# Patient Record
Sex: MALE | Race: WHITE | NOT HISPANIC OR LATINO | Employment: FULL TIME | URBAN - METROPOLITAN AREA
[De-identification: names, ages, dates, MRNs, and addresses within clinical notes are randomized per-mention and may not be internally consistent; named-entity substitution may affect disease eponyms.]

---

## 2017-01-27 ENCOUNTER — HOSPITAL ENCOUNTER (EMERGENCY)
Facility: MEDICAL CENTER | Age: 52
End: 2017-01-27
Attending: EMERGENCY MEDICINE

## 2017-01-27 ENCOUNTER — APPOINTMENT (OUTPATIENT)
Dept: RADIOLOGY | Facility: MEDICAL CENTER | Age: 52
End: 2017-01-27
Attending: EMERGENCY MEDICINE

## 2017-01-27 VITALS
HEART RATE: 60 BPM | TEMPERATURE: 96.8 F | HEIGHT: 69 IN | SYSTOLIC BLOOD PRESSURE: 106 MMHG | DIASTOLIC BLOOD PRESSURE: 78 MMHG | RESPIRATION RATE: 16 BRPM | BODY MASS INDEX: 24.44 KG/M2 | OXYGEN SATURATION: 91 % | WEIGHT: 165 LBS

## 2017-01-27 DIAGNOSIS — N20.1 URETEROLITHIASIS: ICD-10-CM

## 2017-01-27 LAB
APPEARANCE UR: CLEAR
BILIRUB UR QL STRIP.AUTO: NEGATIVE
COLOR UR: COLORLESS
CULTURE IF INDICATED INDCX: NO UA CULTURE
GLUCOSE UR STRIP.AUTO-MCNC: 70 MG/DL
KETONES UR STRIP.AUTO-MCNC: NEGATIVE MG/DL
LEUKOCYTE ESTERASE UR QL STRIP.AUTO: NEGATIVE
MICRO URNS: ABNORMAL
NITRITE UR QL STRIP.AUTO: NEGATIVE
PH UR STRIP.AUTO: 7 [PH]
PROT UR QL STRIP: NEGATIVE MG/DL
RBC UR QL AUTO: NEGATIVE
SP GR UR STRIP.AUTO: 1.01

## 2017-01-27 PROCEDURE — 36415 COLL VENOUS BLD VENIPUNCTURE: CPT

## 2017-01-27 PROCEDURE — 700105 HCHG RX REV CODE 258: Performed by: EMERGENCY MEDICINE

## 2017-01-27 PROCEDURE — 99284 EMERGENCY DEPT VISIT MOD MDM: CPT

## 2017-01-27 PROCEDURE — 96361 HYDRATE IV INFUSION ADD-ON: CPT

## 2017-01-27 PROCEDURE — 700102 HCHG RX REV CODE 250 W/ 637 OVERRIDE(OP): Performed by: EMERGENCY MEDICINE

## 2017-01-27 PROCEDURE — 96375 TX/PRO/DX INJ NEW DRUG ADDON: CPT

## 2017-01-27 PROCEDURE — 700111 HCHG RX REV CODE 636 W/ 250 OVERRIDE (IP): Performed by: EMERGENCY MEDICINE

## 2017-01-27 PROCEDURE — 81003 URINALYSIS AUTO W/O SCOPE: CPT

## 2017-01-27 PROCEDURE — 96374 THER/PROPH/DIAG INJ IV PUSH: CPT

## 2017-01-27 PROCEDURE — 74176 CT ABD & PELVIS W/O CONTRAST: CPT

## 2017-01-27 PROCEDURE — A9270 NON-COVERED ITEM OR SERVICE: HCPCS | Performed by: EMERGENCY MEDICINE

## 2017-01-27 RX ORDER — OXYCODONE HYDROCHLORIDE AND ACETAMINOPHEN 5; 325 MG/1; MG/1
1-2 TABLET ORAL EVERY 4 HOURS PRN
Qty: 20 TAB | Refills: 0 | Status: SHIPPED | OUTPATIENT
Start: 2017-01-27 | End: 2021-07-02

## 2017-01-27 RX ORDER — KETOROLAC TROMETHAMINE 30 MG/ML
30 INJECTION, SOLUTION INTRAMUSCULAR; INTRAVENOUS ONCE
Status: COMPLETED | OUTPATIENT
Start: 2017-01-27 | End: 2017-01-27

## 2017-01-27 RX ORDER — ONDANSETRON 2 MG/ML
4 INJECTION INTRAMUSCULAR; INTRAVENOUS ONCE
Status: COMPLETED | OUTPATIENT
Start: 2017-01-27 | End: 2017-01-27

## 2017-01-27 RX ORDER — OXYCODONE HYDROCHLORIDE AND ACETAMINOPHEN 5; 325 MG/1; MG/1
2 TABLET ORAL ONCE
Status: COMPLETED | OUTPATIENT
Start: 2017-01-27 | End: 2017-01-27

## 2017-01-27 RX ORDER — TAMSULOSIN HYDROCHLORIDE 0.4 MG/1
0.4 CAPSULE ORAL
Qty: 7 CAP | Refills: 1 | Status: SHIPPED | OUTPATIENT
Start: 2017-01-27 | End: 2021-07-02

## 2017-01-27 RX ORDER — SODIUM CHLORIDE 9 MG/ML
1000 INJECTION, SOLUTION INTRAVENOUS ONCE
Status: COMPLETED | OUTPATIENT
Start: 2017-01-27 | End: 2017-01-27

## 2017-01-27 RX ADMIN — SODIUM CHLORIDE 1000 ML: 9 INJECTION, SOLUTION INTRAVENOUS at 06:29

## 2017-01-27 RX ADMIN — HYDROMORPHONE HYDROCHLORIDE 1 MG: 1 INJECTION, SOLUTION INTRAMUSCULAR; INTRAVENOUS; SUBCUTANEOUS at 06:45

## 2017-01-27 RX ADMIN — ONDANSETRON 4 MG: 2 INJECTION, SOLUTION INTRAMUSCULAR; INTRAVENOUS at 06:45

## 2017-01-27 RX ADMIN — KETOROLAC TROMETHAMINE 30 MG: 30 INJECTION, SOLUTION INTRAMUSCULAR; INTRAVENOUS at 06:45

## 2017-01-27 RX ADMIN — OXYCODONE HYDROCHLORIDE AND ACETAMINOPHEN 2 TABLET: 5; 325 TABLET ORAL at 08:11

## 2017-01-27 ASSESSMENT — PAIN SCALES - GENERAL
PAINLEVEL_OUTOF10: 3
PAINLEVEL_OUTOF10: 2
PAINLEVEL_OUTOF10: 2

## 2017-01-27 ASSESSMENT — LIFESTYLE VARIABLES: DO YOU DRINK ALCOHOL: NO

## 2017-01-27 NOTE — ED AVS SNAPSHOT
HelloWallet Access Code: [email or web address]  Expires: 2/26/2017  9:06 AM    Your email address is not on file at [facility].  Email Addresses are required for you to sign up for HelloWallet, please contact [phone] to verify your personal information and to provide your email address prior to attempting to register for HelloWallet.    Iron Chaudhari  [address]  [place] [address]    HelloWallet  A secure, online tool to manage your health information     [facility]’s HelloWallet® is a secure, online tool that connects you to your personalized health information from the privacy of your home -- day or night - making it very easy for you to manage your healthcare. Once the activation process is completed, you can even access your medical information using the HelloWallet amos, which is available for free in the Apple Amos store or Google Play store.     To learn more about HelloWallet, visit [email or web address]    There are two levels of access available (as shown below):   My Chart Features  [facility] Primary Care Doctor [facility]  Specialists [facility]  Urgent  Care Non-[facility] Primary Care Doctor   Email your healthcare team securely and privately 24/7 X X X    Manage appointments: schedule your next appointment; view details of past/upcoming appointments X      Request prescription refills. X      View recent personal medical records, including lab and immunizations X X X X   View health record, including health history, allergies, medications X X X X   Read reports about your outpatient visits, procedures, consult and ER notes X X X X   See your discharge summary, which is a recap of your hospital and/or ER visit that includes your diagnosis, lab results, and care plan X X  X     How to register for Linguastatt:  Once your e-mail address has been verified, follow the following steps to sign up for Linguastatt.     1. Go to  [email or web address]  2. Click on the Sign Up Now box, which takes you to the New Member Sign Up page. You  will need to provide the following information:  a. Enter your Skoodat Access Code exactly as it appears at the top of this page. (You will not need to use this code after you’ve completed the sign-up process. If you do not sign up before the expiration date, you must request a new code.)   b. Enter your date of birth.   c. Enter your home email address.   d. Click Submit, and follow the next screen’s instructions.  3. Create a Transcarga.pet ID. This will be your Skoodat login ID and cannot be changed, so think of one that is secure and easy to remember.  4. Create a Skoodat password. You can change your password at any time.  5. Enter your Password Reset Question and Answer. This can be used at a later time if you forget your password.   6. Enter your e-mail address. This allows you to receive e-mail notifications when new information is available in Skoodat.  7. Click Sign Up. You can now view your health information.    For assistance activating your Skoodat account, call [phone]

## 2017-01-27 NOTE — ED AVS SNAPSHOT
After Visit Summary                                                                                                                Iron Chaudhari   MRN: [id number]    Department:  [facility], Emergency Dept   Date of Visit:  1/27/2017            [facility], Emergency Dept    [address] [place] [address]    Phone:  [phone]      You were seen by     Jm Pool M.D.      Your Diagnosis Was     Ureterolithiasis     N20.1       These are the medications you received during your hospitalization from 01/27/2017 0544 to 01/27/2017 0906     Date/Time Order Dose Route Action    01/27/2017 0629 NS infusion 1,000 mL 1,000 mL Intravenous New Bag    01/27/2017 0645 ketorolac (TORADOL) injection 30 mg 30 mg Intravenous Given    01/27/2017 0645 HYDROmorphone (DILAUDID) injection 1 mg 1 mg Intravenous Given    01/27/2017 0645 ondansetron (ZOFRAN) syringe/vial injection 4 mg 4 mg Intravenous Given    01/27/2017 0811 oxycodone-acetaminophen (PERCOCET) 5-325 MG per tablet 2 Tab 2 Tab Oral Given      Follow-up Information     1. Follow up with [facility], Emergency Dept.    Specialty:  Emergency Medicine    Why:  If symptoms worsen    Contact information    [address] [place] [id number] [phone]      Medication Information     Review all of your home medications and newly ordered medications with your primary doctor and/or pharmacist as soon as possible. Follow medication instructions as directed by your doctor and/or pharmacist.     Please keep your complete medication list with you and share with your physician. Update the information when medications are discontinued, doses are changed, or new medications (including over-the-counter products) are added; and carry medication information at all times in the event of emergency situations.               Medication List      START taking these medications        Instructions    oxycodone-acetaminophen 5-325 MG Tabs   Commonly known as:  PERCOCET    Take 1-2 Tabs by mouth every four hours as needed.   Dose:  1-2 Tab       tamsulosin 0.4 MG capsule   Commonly known as:  FLOMAX    Take 1 Cap by mouth ONE-HALF HOUR AFTER BREAKFAST. Take daily until stone passes.   Dose:  0.4 mg               Procedures and tests performed during your visit     CT-RENAL COLIC EVALUATION(A/P W/O)    IV Saline Lock    URINALYSIS CULTURE, IF INDICATED    URINE STRAINER        Discharge Instructions       Kidney Stones  Kidney stones (urolithiasis) are deposits that form inside your kidneys. The intense pain is caused by the stone moving through the urinary tract. When the stone moves, the ureter goes into spasm around the stone. The stone is usually passed in the urine.   CAUSES   · A disorder that makes certain neck glands produce too much parathyroid hormone (primary hyperparathyroidism).  · A buildup of uric acid crystals, similar to gout in your joints.  · Narrowing (stricture) of the ureter.  · A kidney obstruction present at birth (congenital obstruction).  · Previous surgery on the kidney or ureters.  · Numerous kidney infections.  SYMPTOMS   · Feeling sick to your stomach (nauseous).  · Throwing up (vomiting).  · Blood in the urine (hematuria).  · Pain that usually spreads (radiates) to the groin.  · Frequency or urgency of urination.  DIAGNOSIS   · Taking a history and physical exam.  · Blood or urine tests.  · CT scan.  · Occasionally, an examination of the inside of the urinary bladder (cystoscopy) is performed.  TREATMENT   · Observation.  · Increasing your fluid intake.  · Extracorporeal shock wave lithotripsy--This is a noninvasive procedure that uses shock waves to break up kidney stones.  · Surgery may be needed if you have severe pain or persistent obstruction. There are various surgical procedures. Most of the procedures are performed with the use of small instruments. Only small incisions are  needed to accommodate these instruments, so recovery time is minimized.  The size, location, and chemical composition are all important variables that will determine the proper choice of action for you. Talk to your health care provider to better understand your situation so that you will minimize the risk of injury to yourself and your kidney.   HOME CARE INSTRUCTIONS   · Drink enough water and fluids to keep your urine clear or pale yellow. This will help you to pass the stone or stone fragments.  · Strain all urine through the provided strainer. Keep all particulate matter and stones for your health care provider to see. The stone causing the pain may be as small as a grain of salt. It is very important to use the strainer each and every time you pass your urine. The collection of your stone will allow your health care provider to analyze it and verify that a stone has actually passed. The stone analysis will often identify what you can do to reduce the incidence of recurrences.  · Only take over-the-counter or prescription medicines for pain, discomfort, or fever as directed by your health care provider.  · Make a follow-up appointment with your health care provider as directed.  · Get follow-up X-rays if required. The absence of pain does not always mean that the stone has passed. It may have only stopped moving. If the urine remains completely obstructed, it can cause loss of kidney function or even complete destruction of the kidney. It is your responsibility to make sure X-rays and follow-ups are completed. Ultrasounds of the kidney can show blockages and the status of the kidney. Ultrasounds are not associated with any radiation and can be performed easily in a matter of minutes.  SEEK MEDICAL CARE IF:  · You experience pain that is progressive and unresponsive to any pain medicine you have been prescribed.  SEEK IMMEDIATE MEDICAL CARE IF:   · Pain cannot be controlled with the prescribed medicine.  · You  have a fever or shaking chills.  · The severity or intensity of pain increases over 18 hours and is not relieved by pain medicine.  · You develop a new onset of abdominal pain.  · You feel faint or pass out.  · You are unable to urinate.  MAKE SURE YOU:   · Understand these instructions.  · Will watch your condition.  · Will get help right away if you are not doing well or get worse.     This information is not intended to replace advice given to you by your health care provider. Make sure you discuss any questions you have with your health care provider.     Document Released: 12/18/2006 Document Revised: 01/08/2016 Document Reviewed: 05/21/2014  Ahalogy Interactive Patient Education ©2016 Elsevier Inc.            Patient Information     Patient Information    Following emergency treatment: all patient requiring follow-up care must return either to a private physician or a clinic if your condition worsens before you are able to obtain further medical attention, please return to the emergency room.     Billing Information    At [facility], we work to make the billing process streamlined for our patients.  Our Representatives are here to answer any questions you may have regarding your hospital bill.  If you have insurance coverage and have supplied your insurance information to us, we will submit a claim to your insurer on your behalf.  Should you have any questions regarding your bill, we can be reached online or by phone as follows:  Online: You are able pay your bills online or live chat with our representatives about any billing questions you may have. We are here to help Monday - Friday from 8:00am to 7:30pm and 9:00am - 12:00pm on Saturdays.  Please visit [email or web address]  for more information.   Phone:  [phone] or [phone]    Please note that your emergency physician, surgeon, pathologist, radiologist, anesthesiologist, and other specialists are not employed  by [facility] and will therefore bill separately for their services.  Please contact them directly for any questions concerning their bills at the numbers below:     Emergency Physician Services:  [phone]  [facility]:  [phone]  [facility]:  [phone]  [facility]:  [phone]    1. Your final bill may vary from the amount quoted upon discharge if all procedures are not complete at that time, or if your doctor has additional procedures of which we are not aware. You will receive an additional bill if you return to the Emergency Department at [facility] for suture removal regardless of the facility of which the sutures were placed.     2. Please arrange for settlement of this account at the emergency registration.    3. All self-pay accounts are due in full at the time of treatment.  If you are unable to meet this obligation then payment is expected within 4-5 days.     4. If you have had radiology studies (CT, X-ray, Ultrasound, MRI), you have received a preliminary result during your emergency department visit. Please contact the radiology department [phone] to receive a copy of your final result. Please discuss the Final result with your primary physician or with the follow up physician provided.     Crisis Hotline:  [place] Crisis Hotline:  3-423-GNGAXLZ or 1-503.888.1437  [place] Crisis Hotline:    1-615.511.3585 or 671-780-2043         ED Discharge Follow Up Questions    1. In order to provide you with very good care, we would like to follow up with a phone call in the next few days.  May we have your permission to contact you?     YES /  NO    2. What is the best phone number to call you? (       )_____-__________    3. What is the best time to call you?      Morning  /  Afternoon  /  Evening                   Patient Signature:  ____________________________________________________________    Date:   ____________________________________________________________

## 2017-01-27 NOTE — ED AVS SNAPSHOT
1/27/2017          Iron Chaudhari  [address]  [place] [address]    Dear Iron:    [facility] wants to ensure your discharge home is safe and you or your loved ones have had all your questions answered regarding your care after you leave the hospital.    You may receive a telephone call within two days of your discharge.  This call is to make certain you understand your discharge instructions as well as ensure we provided you with the best care possible during your stay with us.     The call will only last approximately 3-5 minutes and will be done by a nurse.    Once again, we want to ensure your discharge home is safe and that you have a clear understanding of any next steps in your care.  If you have any questions or concerns, please do not hesitate to contact us, we are here for you.  Thank you for choosing [facility] for your healthcare needs.    Sincerely,    Ronaldo Crooks    [facility]

## 2017-01-27 NOTE — ED PROVIDER NOTES
"ED Provider Note    Scribed for Jm Pool M.D. by Maria Luisa Hua. 1/27/2017  6:16 AM    Primary care provider: Pcp Pt States None  Means of arrival: Wheel Chair   History obtained from: Patient   History limited by: acute medical condition     CHIEF COMPLAINT  Chief Complaint   Patient presents with   • Testicle Pain     severe pain for 2 hours. on and off for 2 days.   • Flank Pain     left side.    • LLQ Pain       HPI  Iron Chaudhari is a 51 y.o. male who presents to the Emergency Department with severe left sided flank pain onset 2 days ago. Patient reports intermittent testicular pain and left lower quadrant pain that has been worsening greatly over the past 2 hours. He also reports fever and nausea. Patient denies history of kidney stones. He denies fever or chills.     History of present illness limited secondary to acute medical condition.     REVIEW OF SYSTEMS  Pertinent positives include left flank pain, testicular pain, left lower quadrant pain, fever, nausea.   Pertinent negatives include no fever, chills.      Review of systems limited secondary to acute medical condition.     PAST MEDICAL HISTORY       SURGICAL HISTORY  patient denies any surgical history    SOCIAL HISTORY  Social History   Substance Use Topics   • Smoking status: Current Every Day Smoker   • Alcohol Use: Yes      History   Drug Use No       FAMILY HISTORY  None noted.     CURRENT MEDICATIONS  No current facility-administered medications on file prior to encounter.     No current outpatient prescriptions on file prior to encounter.       ALLERGIES  No Known Allergies    PHYSICAL EXAM  VITAL SIGNS: /80 mmHg  Pulse 62  Temp(Src) 36 °C (96.8 °F)  Resp 18  Ht 1.753 m (5' 9\")  Wt 74.844 kg (165 lb)  BMI 24.36 kg/m2  SpO2 99%    Nursing note and vitals reviewed.  Constitutional: Severe distress. Writhing in bed.   HENT: Head is normocephalic and atraumatic. Oropharynx is clear and moist without exudate or erythema. "   Eyes: Pupils are equal, round, and reactive to light. Conjunctiva are normal.   Cardiovascular: Normal rate and regular rhythm. No murmur heard. Normal radial pulses.  Pulmonary/Chest: Breath sounds normal. No wheezes or rales.   Abdominal: Soft. No distention.  Diffuse left flank tenderness to palpation.   Musculoskeletal: Extremities exhibit normal range of motion without edema.   Neurological: Awake, alert and oriented to person, place, and time. No focal deficits noted.  Skin: Skin is warm and dry. No rash.   Psychiatric: Normal mood and affect. Appropriate for clinical situation    LABS  Results for orders placed or performed during the hospital encounter of 01/27/17   URINALYSIS CULTURE, IF INDICATED   Result Value Ref Range    Color Colorless     Character Clear     Specific Gravity 1.009 <1.035    Ph 7.0 5.0-8.0    Glucose 70 (A) Negative mg/dL    Ketones Negative Negative mg/dL    Protein Negative Negative mg/dL    Bilirubin Negative Negative    Nitrite Negative Negative    Leukocyte Esterase Negative Negative    Occult Blood Negative Negative    Micro Urine Req see below     Culture Indicated No UA Culture   All labs reviewed by me.    RADIOLOGY  CT-RENAL COLIC EVALUATION(A/P W/O)   Final Result         1. Obstructing 4 x 6 mm calculus in the proximal left ureter with moderate left hydronephrosis.      2. No evidence of inflammatory change in the abdomen or pelvis.  The study is however limited due to nonuse of intravenous contrast      The radiologist's interpretation of all radiological studies have been reviewed by me.    COURSE & MEDICAL DECISION MAKING  Nursing notes, VS, PMSFHx reviewed in chart.       6:16 AM - Patient seen and examined at bedside. Patient will be treated with IV fluids, Toradol 30 mg IV, Dilaudid 1 mg IV, and Zofran 4 mg IV. Ordered CT renal colic and urinalysis culture to evaluate his symptoms. I am highly suspicious for kidney stone. I will obtain a CT to confirm.      6:51 AM  Upon recheck, patient reports feeling improved. His pain is now controlled.     8:01 AM Reviewed radiology results indicating 4 by 6 mm kidney stone present.     8:02 AM Upon reassessment, patient is resting in bed. He reports feeling improved. I informed the patient of moderate sized kidney stone present. I will give Percocet. If the patient reports feeling improved, he can be discharged home with a prescription for pain management. If his pain is not relieved with pain medication, he will be admitted. Patient understands and agrees.     8:44 AM Patient rechecked; he reports feeling improved. Patient will be discharged home with prescriptions for pain management. He should return for worsening symptoms. Patient understands and agrees.     The patient was discharged home with an information sheet on kidney stones and told to return immediately for any signs or symptoms listed.  The patient agreed to the discharge precautions and follow-up plan which is documented in EPIC.      I reviewed prescription monitoring program for patient's narcotic use before prescribing a scheduled drug.The patient will not drink alcohol nor drive with prescribed medications. The patient will return for new or worsening symptoms and is stable at the time of discharge.    The patient is referred to a primary physician for blood pressure management, diabetic screening, and for all other preventative health concerns.    DISPOSITION:  Patient will be discharged home in stable condition.    FOLLOW UP:  [facility], Emergency Dept  [address] [address] [phone]    If symptoms worsen      OUTPATIENT MEDICATIONS:  New Prescriptions    OXYCODONE-ACETAMINOPHEN (PERCOCET) 5-325 MG TAB    Take 1-2 Tabs by mouth every four hours as needed.    TAMSULOSIN (FLOMAX) 0.4 MG CAPSULE    Take 1 Cap by mouth ONE-HALF HOUR AFTER BREAKFAST. Take daily until stone passes.           FINAL IMPRESSION  1. Ureterolithiasis           IMaria Luisa (Scribe), am scribing for, and in the presence of, Jm Pool M.D..    Electronically signed by: Maria Luisa Hua ([place]), 1/27/2017    Jm FAITH M.D. personally performed the services described in this documentation, as scribed by Maria Luisa Hua in my presence, and it is both accurate and complete.    The note accurately reflects work and decisions made by me.  Jm Pool  1/27/2017  9:24 AM

## 2017-01-27 NOTE — DISCHARGE INSTRUCTIONS
Kidney Stones  Kidney stones (urolithiasis) are deposits that form inside your kidneys. The intense pain is caused by the stone moving through the urinary tract. When the stone moves, the ureter goes into spasm around the stone. The stone is usually passed in the urine.   CAUSES   · A disorder that makes certain neck glands produce too much parathyroid hormone (primary hyperparathyroidism).  · A buildup of uric acid crystals, similar to gout in your joints.  · Narrowing (stricture) of the ureter.  · A kidney obstruction present at birth (congenital obstruction).  · Previous surgery on the kidney or ureters.  · Numerous kidney infections.  SYMPTOMS   · Feeling sick to your stomach (nauseous).  · Throwing up (vomiting).  · Blood in the urine (hematuria).  · Pain that usually spreads (radiates) to the groin.  · Frequency or urgency of urination.  DIAGNOSIS   · Taking a history and physical exam.  · Blood or urine tests.  · CT scan.  · Occasionally, an examination of the inside of the urinary bladder (cystoscopy) is performed.  TREATMENT   · Observation.  · Increasing your fluid intake.  · Extracorporeal shock wave lithotripsy--This is a noninvasive procedure that uses shock waves to break up kidney stones.  · Surgery may be needed if you have severe pain or persistent obstruction. There are various surgical procedures. Most of the procedures are performed with the use of small instruments. Only small incisions are needed to accommodate these instruments, so recovery time is minimized.  The size, location, and chemical composition are all important variables that will determine the proper choice of action for you. Talk to your health care provider to better understand your situation so that you will minimize the risk of injury to yourself and your kidney.   HOME CARE INSTRUCTIONS   · Drink enough water and fluids to keep your urine clear or pale yellow. This will help you to pass the stone or stone fragments.  · Strain  all urine through the provided strainer. Keep all particulate matter and stones for your health care provider to see. The stone causing the pain may be as small as a grain of salt. It is very important to use the strainer each and every time you pass your urine. The collection of your stone will allow your health care provider to analyze it and verify that a stone has actually passed. The stone analysis will often identify what you can do to reduce the incidence of recurrences.  · Only take over-the-counter or prescription medicines for pain, discomfort, or fever as directed by your health care provider.  · Make a follow-up appointment with your health care provider as directed.  · Get follow-up X-rays if required. The absence of pain does not always mean that the stone has passed. It may have only stopped moving. If the urine remains completely obstructed, it can cause loss of kidney function or even complete destruction of the kidney. It is your responsibility to make sure X-rays and follow-ups are completed. Ultrasounds of the kidney can show blockages and the status of the kidney. Ultrasounds are not associated with any radiation and can be performed easily in a matter of minutes.  SEEK MEDICAL CARE IF:  · You experience pain that is progressive and unresponsive to any pain medicine you have been prescribed.  SEEK IMMEDIATE MEDICAL CARE IF:   · Pain cannot be controlled with the prescribed medicine.  · You have a fever or shaking chills.  · The severity or intensity of pain increases over 18 hours and is not relieved by pain medicine.  · You develop a new onset of abdominal pain.  · You feel faint or pass out.  · You are unable to urinate.  MAKE SURE YOU:   · Understand these instructions.  · Will watch your condition.  · Will get help right away if you are not doing well or get worse.     This information is not intended to replace advice given to you by your health care provider. Make sure you discuss any  questions you have with your health care provider.     Document Released: 12/18/2006 Document Revised: 01/08/2016 Document Reviewed: 05/21/2014  [employer] Patient Education ©2016 [employer]

## 2017-01-27 NOTE — ED NOTES
Chief Complaint   Patient presents with   • Testicle Pain     severe pain for 2 hours. on and off for 2 days.   • Flank Pain     left side.    • LLQ Pain     Pt ambulatory to triage with above complaint. Pt returned to [facility], educated on triage process, and to inform staff of any changes or concerns.   Patient states that the severity of his pain waxes and wanes.

## 2017-01-27 NOTE — ED NOTES
Agree with triage. Pt in visible distress. PIV placed, blood drawn and sent to lab. Pt placed in gown and on monitor. Chart up for ERP.

## 2021-07-02 ENCOUNTER — APPOINTMENT (OUTPATIENT)
Dept: RADIOLOGY | Facility: MEDICAL CENTER | Age: 56
End: 2021-07-02
Attending: EMERGENCY MEDICINE

## 2021-07-02 ENCOUNTER — HOSPITAL ENCOUNTER (EMERGENCY)
Facility: MEDICAL CENTER | Age: 56
End: 2021-07-02
Attending: EMERGENCY MEDICINE

## 2021-07-02 VITALS
TEMPERATURE: 98.3 F | SYSTOLIC BLOOD PRESSURE: 127 MMHG | WEIGHT: 175.93 LBS | DIASTOLIC BLOOD PRESSURE: 87 MMHG | HEART RATE: 73 BPM | BODY MASS INDEX: 26.66 KG/M2 | HEIGHT: 68 IN | OXYGEN SATURATION: 97 % | RESPIRATION RATE: 17 BRPM

## 2021-07-02 DIAGNOSIS — M75.31 CALCIFIC TENDINITIS OF RIGHT SHOULDER: ICD-10-CM

## 2021-07-02 DIAGNOSIS — S42.291A CLOSED HILL-SACHS FRACTURE OF RIGHT HUMERUS, INITIAL ENCOUNTER: ICD-10-CM

## 2021-07-02 DIAGNOSIS — M25.531 WRIST PAIN, ACUTE, RIGHT: ICD-10-CM

## 2021-07-02 PROCEDURE — 96374 THER/PROPH/DIAG INJ IV PUSH: CPT

## 2021-07-02 PROCEDURE — 96375 TX/PRO/DX INJ NEW DRUG ADDON: CPT

## 2021-07-02 PROCEDURE — 73030 X-RAY EXAM OF SHOULDER: CPT | Mod: RT

## 2021-07-02 PROCEDURE — 700111 HCHG RX REV CODE 636 W/ 250 OVERRIDE (IP): Performed by: EMERGENCY MEDICINE

## 2021-07-02 PROCEDURE — 73110 X-RAY EXAM OF WRIST: CPT | Mod: RT

## 2021-07-02 PROCEDURE — 99284 EMERGENCY DEPT VISIT MOD MDM: CPT

## 2021-07-02 PROCEDURE — 36415 COLL VENOUS BLD VENIPUNCTURE: CPT

## 2021-07-02 RX ORDER — OXYCODONE HYDROCHLORIDE AND ACETAMINOPHEN 5; 325 MG/1; MG/1
1 TABLET ORAL EVERY 8 HOURS PRN
Qty: 16 TABLET | Refills: 0 | Status: SHIPPED | OUTPATIENT
Start: 2021-07-02 | End: 2021-07-07

## 2021-07-02 RX ORDER — ONDANSETRON 2 MG/ML
4 INJECTION INTRAMUSCULAR; INTRAVENOUS ONCE
Status: COMPLETED | OUTPATIENT
Start: 2021-07-02 | End: 2021-07-02

## 2021-07-02 RX ADMIN — FENTANYL CITRATE 50 MCG: 50 INJECTION, SOLUTION INTRAMUSCULAR; INTRAVENOUS at 06:34

## 2021-07-02 RX ADMIN — ONDANSETRON 4 MG: 2 INJECTION INTRAMUSCULAR; INTRAVENOUS at 06:34

## 2021-07-02 ASSESSMENT — PAIN DESCRIPTION - PAIN TYPE: TYPE: ACUTE PAIN

## 2021-07-02 NOTE — ED NOTES
Pt given d/c paperwork and RX p/u information, pt verbalized understanding all  information given, narc consent signed and placed in chart.    ED tech at BS to place sling and wrist splint per order

## 2021-07-02 NOTE — DISCHARGE INSTRUCTIONS
Apply ice to your sore areas.  Use ibuprofen 600 mg for pain.  Take the narcotic pain medication for severe pain.  Do not drive or operate heavy machinery if taking.  It is very important you follow-up with the orthopedic doctor to further evaluate your wrist and shoulder pain.  I would avoid shoveling or strenuous use of your wrist or shoulder for the next 7 days.  You can do gentle range of motion of your shoulder and wrist.  Any numbness weakness fever worsening pain return.  I hope you feel better soon.

## 2021-07-02 NOTE — ED PROVIDER NOTES
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 States he has had problems with the shoulder in the past.   • Wrist Injury     States tat while he was working he thinks he sprained his right wrist       HPI  Iron Chaudhari is a 55 y.o. male who presents with shoulder and wrist pain.  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  He has a history of multiple shoulder dislocations in the past.  States usually once he relaxes it will go back in.  However he is waited 3 days and he still having difficulty moving it and feels like it still out.  Patient denies any numbness tingling or weakness in his hand.  No fevers.  Patient also is complaining of right wrist pain.  He thinks he sprained his wrist.  Thinks it is related to shoveling.  He has pain with flexion extension of his wrist.  Feels better when he wears his Ace wrap on it.  Denies any coolness to his hand.    REVIEW OF SYSTEMS  Positive for wrist and shoulder pain, Negative for no numbness, coolness, weakness, skin rash, fevers.    PAST MEDICAL HISTORY   Previous shoulder dislocation    SOCIAL HISTORY  Social History     Tobacco Use   • Smoking status: Current Some Day Smoker   • Smokeless tobacco: Current User     Types: Chew   Vaping Use   • Vaping Use: Never used   Substance and Sexual Activity   • Alcohol use: Yes   • Drug use: No   • Sexual activity: Not on file       SURGICAL HISTORY   has a past surgical history that includes ear middle exploration.    CURRENT MEDICATIONS  Reviewed.  See Encounter Summary.      ALLERGIES  No Known Allergies    PHYSICAL EXAM  VITAL SIGNS: /101   Pulse 84   Temp 36.8 °C (98.3 °F) (Temporal)   Resp 19   Ht 1.727 m (5' 8\")   Wt 79.8 kg (175 lb 14.8 oz)   SpO2 99%   BMI 26.75 kg/m²   Constitutional:  Alert in mild apparent " distress.  HENT: Normocephalic, Bilateral external ears normal. Nose normal.   Eyes: Pupils are equal and reactive. Conjunctiva normal, non-icteric.   Thorax & Lungs: Easy unlabored respirations, clear to auscultation  Cardiovascular: Regular rate and rhythm  Abdomen:  No gross signs of peritonitis, no pain with movement   Skin: Visualized skin is  Dry, No erythema, No rash.   Extremities: Deformity to right shoulder, anterior fullness, unable to lift arm secondary to pain, no warmth erythema to the shoulder, +2 radial pulse, tenderness in the wrist over the scaphoid.  Minimal wrist swelling.  No warmth or erythema to the wrist.  Pain with range of motion.  Good  strength.  Good cap refill.  Intact sensation to light touch.  Neurologic: Alert, Grossly non-focal.   Psychiatric: Affect and Mood normal    Radiology  DX-WRIST-COMPLETE 3+ RIGHT   Final Result      No radiographic evidence of acute traumatic injury.      DX-SHOULDER 2+ RIGHT   Final Result      Dorsal humeral head cortex irregularity could represent an acute or chronic Hill-Sachs fracture      Small overlying calcification could be acute or chronic with dystrophic calcification, calcific tendinopathy or displaced cortical fracture      Given provided history of dislocation and possible Hill-Sachs fracture, follow-up shoulder MRI is advised to further characterize            COURSE & MEDICAL DECISION MAKING  Nursing notes and vital signs were reviewed. (See chart for details)    The patient presents to the Emergency Department with shoulder and wrist pain.  Shoulder pain likely dislocation versus subluxation.  History of frequent dislocations in the past.  Patient also with wrist pain.  Differential includes possible tendinitis versus ligament injury to the wrist versus fracture.  No history of fevers or illness I do not suspect an infectious etiology.    X-ray has returned to does not show dislocation.  I suspect this is likely [place] Sachs fracture  although the exact timing of it is unclear.  With his history of multiple previous shoulder dislocations I am unsure if this is acute or chronic.  Suspect this is likely more chronic.  I will place him in a sling.  I stressed to the patient he also likely has ligament injury and needs to follow-up with Ortho as he may need an MRI to further evaluate.  Concerning his wrist we placed in a Velcro splint.  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  He does have some scaphoid tenderness but no history of acute injury and therefore I doubt acute scaphoid fracture.  He will be placed in a Velcro splint.  And I stressed again follow-up with Ortho.  He understands the plan.    In prescribing controlled substances to this patient, I certify that I have obtained and reviewed the medical history of Iron Chaudhari. I have also made a good renate effort to obtain applicable records from other providers who have treated the patient and no other records are available at this time.     I have conducted a physical exam and documented it. I have reviewed Mr. Chaudhari’s prescription history as maintained by the [place] Prescription Monitoring Program.     I have assessed the patient’s risk for abuse, dependency, and addiction using the validated Opioid Risk Tool available at https://www.mdcalc.com/zwsoew-lkjz-pngh-ort-narcotic-abuse.     Given the above, I believe the benefits of controlled substance therapy outweigh the risks. The reasons for prescribing controlled substances include non-narcotic, oral analgesic alternatives have been inadequate for pain control. Accordingly, I have discussed the risk and benefits, treatment plan, and alternative therapies with the patient.        The patient verbally agreed to the discharge precautions and follow-up plan which is documented in EPIC.    FINAL IMPRESSION  1. Wrist pain, acute, right  Wrist Splint   2. Closed Hill-Sachs fracture  of right humerus, initial encounter  oxyCODONE-acetaminophen (PERCOCET) 5-325 MG Tab    Slings   3. Calcific tendinitis of right shoulder     .

## 2024-04-02 ENCOUNTER — APPOINTMENT (OUTPATIENT)
Dept: RADIOLOGY | Facility: MEDICAL CENTER | Age: 59
End: 2024-04-02
Attending: STUDENT IN AN ORGANIZED HEALTH CARE EDUCATION/TRAINING PROGRAM

## 2024-04-02 ENCOUNTER — APPOINTMENT (OUTPATIENT)
Dept: RADIOLOGY | Facility: MEDICAL CENTER | Age: 59
End: 2024-04-02
Attending: EMERGENCY MEDICINE

## 2024-04-02 ENCOUNTER — APPOINTMENT (OUTPATIENT)
Dept: RADIOLOGY | Facility: MEDICAL CENTER | Age: 59
End: 2024-04-02
Attending: NURSE PRACTITIONER

## 2024-04-02 ENCOUNTER — HOSPITAL ENCOUNTER (OUTPATIENT)
Facility: MEDICAL CENTER | Age: 59
End: 2024-04-04
Attending: EMERGENCY MEDICINE | Admitting: STUDENT IN AN ORGANIZED HEALTH CARE EDUCATION/TRAINING PROGRAM

## 2024-04-02 DIAGNOSIS — R10.13 EPIGASTRIC PAIN: ICD-10-CM

## 2024-04-02 DIAGNOSIS — R10.9 ACUTE ABDOMINAL PAIN: ICD-10-CM

## 2024-04-02 DIAGNOSIS — K25.3 ACUTE GASTRIC ULCER WITHOUT HEMORRHAGE OR PERFORATION: ICD-10-CM

## 2024-04-02 PROBLEM — M25.511 ACUTE PAIN OF RIGHT SHOULDER: Status: ACTIVE | Noted: 2024-04-02

## 2024-04-02 LAB
ALBUMIN SERPL BCP-MCNC: 4.2 G/DL (ref 3.2–4.9)
ALBUMIN/GLOB SERPL: 1.3 G/DL
ALP SERPL-CCNC: 105 U/L (ref 30–99)
ALT SERPL-CCNC: 19 U/L (ref 2–50)
ANION GAP SERPL CALC-SCNC: 14 MMOL/L (ref 7–16)
AST SERPL-CCNC: 18 U/L (ref 12–45)
BASOPHILS # BLD AUTO: 1 % (ref 0–1.8)
BASOPHILS # BLD: 0.1 K/UL (ref 0–0.12)
BILIRUB SERPL-MCNC: 0.8 MG/DL (ref 0.1–1.5)
BUN SERPL-MCNC: 14 MG/DL (ref 8–22)
CALCIUM ALBUM COR SERPL-MCNC: 9.7 MG/DL (ref 8.5–10.5)
CALCIUM SERPL-MCNC: 9.9 MG/DL (ref 8.5–10.5)
CHLORIDE SERPL-SCNC: 100 MMOL/L (ref 96–112)
CO2 SERPL-SCNC: 24 MMOL/L (ref 20–33)
CREAT SERPL-MCNC: 1.07 MG/DL (ref 0.5–1.4)
EKG IMPRESSION: NORMAL
EOSINOPHIL # BLD AUTO: 0.19 K/UL (ref 0–0.51)
EOSINOPHIL NFR BLD: 1.9 % (ref 0–6.9)
ERYTHROCYTE [DISTWIDTH] IN BLOOD BY AUTOMATED COUNT: 38.3 FL (ref 35.9–50)
GFR SERPLBLD CREATININE-BSD FMLA CKD-EPI: 79 ML/MIN/1.73 M 2
GLOBULIN SER CALC-MCNC: 3.3 G/DL (ref 1.9–3.5)
GLUCOSE SERPL-MCNC: 115 MG/DL (ref 65–99)
HCT VFR BLD AUTO: 49.7 % (ref 42–52)
HGB BLD-MCNC: 17.1 G/DL (ref 14–18)
IMM GRANULOCYTES # BLD AUTO: 0.05 K/UL (ref 0–0.11)
IMM GRANULOCYTES NFR BLD AUTO: 0.5 % (ref 0–0.9)
LIPASE SERPL-CCNC: 17 U/L (ref 11–82)
LIPASE SERPL-CCNC: 21 U/L (ref 11–82)
LYMPHOCYTES # BLD AUTO: 2.2 K/UL (ref 1–4.8)
LYMPHOCYTES NFR BLD: 21.8 % (ref 22–41)
MCH RBC QN AUTO: 29 PG (ref 27–33)
MCHC RBC AUTO-ENTMCNC: 34.4 G/DL (ref 32.3–36.5)
MCV RBC AUTO: 84.2 FL (ref 81.4–97.8)
MONOCYTES # BLD AUTO: 0.79 K/UL (ref 0–0.85)
MONOCYTES NFR BLD AUTO: 7.8 % (ref 0–13.4)
NEUTROPHILS # BLD AUTO: 6.74 K/UL (ref 1.82–7.42)
NEUTROPHILS NFR BLD: 67 % (ref 44–72)
NRBC # BLD AUTO: 0 K/UL
NRBC BLD-RTO: 0 /100 WBC (ref 0–0.2)
PLATELET # BLD AUTO: 301 K/UL (ref 164–446)
PMV BLD AUTO: 8.6 FL (ref 9–12.9)
POTASSIUM SERPL-SCNC: 4.4 MMOL/L (ref 3.6–5.5)
PROT SERPL-MCNC: 7.5 G/DL (ref 6–8.2)
RBC # BLD AUTO: 5.9 M/UL (ref 4.7–6.1)
SODIUM SERPL-SCNC: 138 MMOL/L (ref 135–145)
TROPONIN T SERPL-MCNC: 18 NG/L (ref 6–19)
TROPONIN T SERPL-MCNC: 19 NG/L (ref 6–19)
WBC # BLD AUTO: 10.1 K/UL (ref 4.8–10.8)

## 2024-04-02 PROCEDURE — 96375 TX/PRO/DX INJ NEW DRUG ADDON: CPT

## 2024-04-02 PROCEDURE — 83690 ASSAY OF LIPASE: CPT

## 2024-04-02 PROCEDURE — 96376 TX/PRO/DX INJ SAME DRUG ADON: CPT

## 2024-04-02 PROCEDURE — 99222 1ST HOSP IP/OBS MODERATE 55: CPT | Performed by: STUDENT IN AN ORGANIZED HEALTH CARE EDUCATION/TRAINING PROGRAM

## 2024-04-02 PROCEDURE — 700102 HCHG RX REV CODE 250 W/ 637 OVERRIDE(OP): Performed by: NURSE PRACTITIONER

## 2024-04-02 PROCEDURE — 84484 ASSAY OF TROPONIN QUANT: CPT | Mod: 91

## 2024-04-02 PROCEDURE — 36415 COLL VENOUS BLD VENIPUNCTURE: CPT

## 2024-04-02 PROCEDURE — G0378 HOSPITAL OBSERVATION PER HR: HCPCS

## 2024-04-02 PROCEDURE — A9270 NON-COVERED ITEM OR SERVICE: HCPCS | Performed by: STUDENT IN AN ORGANIZED HEALTH CARE EDUCATION/TRAINING PROGRAM

## 2024-04-02 PROCEDURE — 80053 COMPREHEN METABOLIC PANEL: CPT

## 2024-04-02 PROCEDURE — 700111 HCHG RX REV CODE 636 W/ 250 OVERRIDE (IP): Mod: JZ | Performed by: EMERGENCY MEDICINE

## 2024-04-02 PROCEDURE — C9113 INJ PANTOPRAZOLE SODIUM, VIA: HCPCS | Performed by: NURSE PRACTITIONER

## 2024-04-02 PROCEDURE — A9270 NON-COVERED ITEM OR SERVICE: HCPCS | Performed by: NURSE PRACTITIONER

## 2024-04-02 PROCEDURE — 700102 HCHG RX REV CODE 250 W/ 637 OVERRIDE(OP): Performed by: STUDENT IN AN ORGANIZED HEALTH CARE EDUCATION/TRAINING PROGRAM

## 2024-04-02 PROCEDURE — 700105 HCHG RX REV CODE 258: Performed by: STUDENT IN AN ORGANIZED HEALTH CARE EDUCATION/TRAINING PROGRAM

## 2024-04-02 PROCEDURE — 85025 COMPLETE CBC W/AUTO DIFF WBC: CPT

## 2024-04-02 PROCEDURE — 96374 THER/PROPH/DIAG INJ IV PUSH: CPT

## 2024-04-02 PROCEDURE — 99204 OFFICE O/P NEW MOD 45 MIN: CPT | Performed by: NURSE PRACTITIONER

## 2024-04-02 PROCEDURE — 74210 X-RAY XM PHRNX&/CRV ESOPH C+: CPT

## 2024-04-02 PROCEDURE — A9270 NON-COVERED ITEM OR SERVICE: HCPCS | Performed by: EMERGENCY MEDICINE

## 2024-04-02 PROCEDURE — 93005 ELECTROCARDIOGRAM TRACING: CPT

## 2024-04-02 PROCEDURE — 99285 EMERGENCY DEPT VISIT HI MDM: CPT

## 2024-04-02 PROCEDURE — 73030 X-RAY EXAM OF SHOULDER: CPT | Mod: RT

## 2024-04-02 PROCEDURE — 93005 ELECTROCARDIOGRAM TRACING: CPT | Performed by: EMERGENCY MEDICINE

## 2024-04-02 PROCEDURE — 700117 HCHG RX CONTRAST REV CODE 255: Performed by: NURSE PRACTITIONER

## 2024-04-02 PROCEDURE — 71275 CT ANGIOGRAPHY CHEST: CPT

## 2024-04-02 PROCEDURE — 700102 HCHG RX REV CODE 250 W/ 637 OVERRIDE(OP): Performed by: EMERGENCY MEDICINE

## 2024-04-02 PROCEDURE — 700111 HCHG RX REV CODE 636 W/ 250 OVERRIDE (IP): Performed by: NURSE PRACTITIONER

## 2024-04-02 PROCEDURE — 700117 HCHG RX CONTRAST REV CODE 255: Performed by: EMERGENCY MEDICINE

## 2024-04-02 PROCEDURE — 71045 X-RAY EXAM CHEST 1 VIEW: CPT

## 2024-04-02 RX ORDER — MORPHINE SULFATE 4 MG/ML
4 INJECTION INTRAVENOUS
Status: COMPLETED | OUTPATIENT
Start: 2024-04-02 | End: 2024-04-02

## 2024-04-02 RX ORDER — MORPHINE SULFATE 4 MG/ML
4 INJECTION INTRAVENOUS ONCE
Status: COMPLETED | OUTPATIENT
Start: 2024-04-02 | End: 2024-04-02

## 2024-04-02 RX ORDER — PROMETHAZINE HYDROCHLORIDE 25 MG/1
12.5-25 TABLET ORAL EVERY 4 HOURS PRN
Status: DISCONTINUED | OUTPATIENT
Start: 2024-04-02 | End: 2024-04-04 | Stop reason: HOSPADM

## 2024-04-02 RX ORDER — GUAIFENESIN/DEXTROMETHORPHAN 100-10MG/5
10 SYRUP ORAL EVERY 6 HOURS PRN
Status: DISCONTINUED | OUTPATIENT
Start: 2024-04-02 | End: 2024-04-04 | Stop reason: HOSPADM

## 2024-04-02 RX ORDER — OXYCODONE HYDROCHLORIDE 5 MG/1
5 TABLET ORAL
Status: DISCONTINUED | OUTPATIENT
Start: 2024-04-02 | End: 2024-04-04 | Stop reason: HOSPADM

## 2024-04-02 RX ORDER — HYDROMORPHONE HYDROCHLORIDE 1 MG/ML
0.25 INJECTION, SOLUTION INTRAMUSCULAR; INTRAVENOUS; SUBCUTANEOUS
Status: DISCONTINUED | OUTPATIENT
Start: 2024-04-02 | End: 2024-04-04 | Stop reason: HOSPADM

## 2024-04-02 RX ORDER — ONDANSETRON 4 MG/1
4 TABLET, ORALLY DISINTEGRATING ORAL EVERY 4 HOURS PRN
Status: DISCONTINUED | OUTPATIENT
Start: 2024-04-02 | End: 2024-04-04 | Stop reason: HOSPADM

## 2024-04-02 RX ORDER — PROCHLORPERAZINE EDISYLATE 5 MG/ML
5-10 INJECTION INTRAMUSCULAR; INTRAVENOUS EVERY 4 HOURS PRN
Status: DISCONTINUED | OUTPATIENT
Start: 2024-04-02 | End: 2024-04-04 | Stop reason: HOSPADM

## 2024-04-02 RX ORDER — PROMETHAZINE HYDROCHLORIDE 25 MG/1
12.5-25 SUPPOSITORY RECTAL EVERY 4 HOURS PRN
Status: DISCONTINUED | OUTPATIENT
Start: 2024-04-02 | End: 2024-04-04 | Stop reason: HOSPADM

## 2024-04-02 RX ORDER — LABETALOL HYDROCHLORIDE 5 MG/ML
10 INJECTION, SOLUTION INTRAVENOUS EVERY 4 HOURS PRN
Status: DISCONTINUED | OUTPATIENT
Start: 2024-04-02 | End: 2024-04-04 | Stop reason: HOSPADM

## 2024-04-02 RX ORDER — OXYCODONE HYDROCHLORIDE 5 MG/1
2.5 TABLET ORAL
Status: DISCONTINUED | OUTPATIENT
Start: 2024-04-02 | End: 2024-04-04 | Stop reason: HOSPADM

## 2024-04-02 RX ORDER — ACETAMINOPHEN 325 MG/1
650 TABLET ORAL EVERY 6 HOURS PRN
Status: DISCONTINUED | OUTPATIENT
Start: 2024-04-02 | End: 2024-04-04 | Stop reason: HOSPADM

## 2024-04-02 RX ORDER — SODIUM CHLORIDE 9 MG/ML
INJECTION, SOLUTION INTRAVENOUS CONTINUOUS
Status: ACTIVE | OUTPATIENT
Start: 2024-04-02 | End: 2024-04-03

## 2024-04-02 RX ORDER — PANTOPRAZOLE SODIUM 40 MG/10ML
40 INJECTION, POWDER, LYOPHILIZED, FOR SOLUTION INTRAVENOUS 2 TIMES DAILY
Status: DISCONTINUED | OUTPATIENT
Start: 2024-04-02 | End: 2024-04-04 | Stop reason: HOSPADM

## 2024-04-02 RX ORDER — ONDANSETRON 2 MG/ML
4 INJECTION INTRAMUSCULAR; INTRAVENOUS EVERY 4 HOURS PRN
Status: DISCONTINUED | OUTPATIENT
Start: 2024-04-02 | End: 2024-04-04 | Stop reason: HOSPADM

## 2024-04-02 RX ADMIN — PANTOPRAZOLE SODIUM 40 MG: 40 INJECTION, POWDER, LYOPHILIZED, FOR SOLUTION INTRAVENOUS at 17:33

## 2024-04-02 RX ADMIN — IOHEXOL 65 ML: 350 INJECTION, SOLUTION INTRAVENOUS at 11:45

## 2024-04-02 RX ADMIN — OXYCODONE 5 MG: 5 TABLET ORAL at 21:17

## 2024-04-02 RX ADMIN — OXYCODONE 5 MG: 5 TABLET ORAL at 14:18

## 2024-04-02 RX ADMIN — MORPHINE SULFATE 4 MG: 4 INJECTION, SOLUTION INTRAMUSCULAR; INTRAVENOUS at 07:54

## 2024-04-02 RX ADMIN — IOHEXOL 200 ML: 350 INJECTION, SOLUTION INTRAVENOUS at 16:00

## 2024-04-02 RX ADMIN — BARIUM SULFATE 700 MG: 700 TABLET ORAL at 16:00

## 2024-04-02 RX ADMIN — LIDOCAINE HYDROCHLORIDE 30 ML: 20 SOLUTION ORAL; TOPICAL at 07:53

## 2024-04-02 RX ADMIN — SODIUM CHLORIDE: 9 INJECTION, SOLUTION INTRAVENOUS at 14:21

## 2024-04-02 RX ADMIN — LIDOCAINE HYDROCHLORIDE 30 ML: 20 SOLUTION OROPHARYNGEAL at 15:21

## 2024-04-02 RX ADMIN — MORPHINE SULFATE 4 MG: 4 INJECTION, SOLUTION INTRAMUSCULAR; INTRAVENOUS at 10:33

## 2024-04-02 ASSESSMENT — ENCOUNTER SYMPTOMS
SORE THROAT: 0
HEARTBURN: 0
DEPRESSION: 0
MYALGIAS: 1
ORTHOPNEA: 0
HEADACHES: 0
COUGH: 0
NAUSEA: 1
NECK PAIN: 0
BLURRED VISION: 0
DIZZINESS: 0
VOMITING: 1
PALPITATIONS: 0
DOUBLE VISION: 0
SPUTUM PRODUCTION: 0
FEVER: 0
FOCAL WEAKNESS: 0
SHORTNESS OF BREATH: 0
CHILLS: 0
ABDOMINAL PAIN: 1

## 2024-04-02 ASSESSMENT — PAIN DESCRIPTION - PAIN TYPE
TYPE: ACUTE PAIN

## 2024-04-02 ASSESSMENT — LIFESTYLE VARIABLES
AVERAGE NUMBER OF DAYS PER WEEK YOU HAVE A DRINK CONTAINING ALCOHOL: 0
EVER HAD A DRINK FIRST THING IN THE MORNING TO STEADY YOUR NERVES TO GET RID OF A HANGOVER: NO
TOTAL SCORE: 0
ALCOHOL_USE: YES
HAVE PEOPLE ANNOYED YOU BY CRITICIZING YOUR DRINKING: NO
TOTAL SCORE: 0
ON A TYPICAL DAY WHEN YOU DRINK ALCOHOL HOW MANY DRINKS DO YOU HAVE: 1
HOW MANY TIMES IN THE PAST YEAR HAVE YOU HAD 5 OR MORE DRINKS IN A DAY: 0
EVER FELT BAD OR GUILTY ABOUT YOUR DRINKING: NO
CONSUMPTION TOTAL: NEGATIVE
HAVE YOU EVER FELT YOU SHOULD CUT DOWN ON YOUR DRINKING: NO
TOTAL SCORE: 0

## 2024-04-02 ASSESSMENT — FIBROSIS 4 INDEX
FIB4 SCORE: 0.82
FIB4 SCORE: 0.78

## 2024-04-02 ASSESSMENT — PATIENT HEALTH QUESTIONNAIRE - PHQ9
2. FEELING DOWN, DEPRESSED, IRRITABLE, OR HOPELESS: NOT AT ALL
SUM OF ALL RESPONSES TO PHQ9 QUESTIONS 1 AND 2: 0
1. LITTLE INTEREST OR PLEASURE IN DOING THINGS: NOT AT ALL

## 2024-04-02 NOTE — ED PROVIDER NOTES
ED Provider Note    Primary care provider: Pcp Pt States None    CHIEF COMPLAINT  Chief Complaint   Patient presents with    Chest Pain    Shortness of Breath     Concern for pulled muscle midline epigastric area. Shoveled snow on Thursday and had pain that began Saturday night. 7/10 pain, difficulty breathing. 98% on RA in triage    Nausea     Gas pressure, unable to eat due to nausea, onset Friday       HPI  Iron Chaudhari is a 58 y.o. male who presents to the Emergency Department for fairly severe epigastric pain.  The patient was raking leaves on Saturday morning (3 days ago) when he felt abrupt onset of lower chest/upper epigastric pain, he felt like there was a lump that dropped down into his upper epigastrium.  He notes persistent discomfort there, may be slightly worsened if he lies flat.  He has been able to drink some milk and water which improves the pain, he has not been able to eat as it causes him to get quite nauseated and worsens the pain.  He describes a history of having some difficulty getting down foods, feels it catches in his esophagus, this has been an ongoing issue for months and feels that perhaps that was what caused the pain to begin.  He was not eating anything when he was raking on Saturday.    He initially thought it might be GERD/gastritis but the symptoms really have not improved in the past 72 hours.  He feels short of breath, has some worsening with deep inspiration.  He denies any numbness, did have some brief tingling in the fingertips which has subsequently resolved.  Denies any focal weakness.  Denies fevers or chills.  Has felt intermittently lightheaded.  Does not note any back pain.  Has not had any radiation of the pain.    No cardiac history, no smoking history.      External Record Review: Infrequent ER visits.  Patient last seen at [facility] emergency department in September 2022 for hand cellulitis.    REVIEW OF SYSTEMS  See HPI.     PAST MEDICAL HISTORY    "    SURGICAL HISTORY   has a past surgical history that includes ear middle exploration.    SOCIAL HISTORY  Social History     Tobacco Use    Smoking status: Former     Current packs/day: 0.25     Average packs/day: 0.3 packs/day for 10.0 years (2.5 ttl pk-yrs)     Types: Cigarettes    Smokeless tobacco: Current     Types: Chew    Tobacco comments:     Chews 3 days/week   Vaping Use    Vaping Use: Never used   Substance Use Topics    Alcohol use: Not Currently    Drug use: No      Social History     Substance and Sexual Activity   Drug Use No       FAMILY HISTORY  History reviewed. No pertinent family history.    CURRENT MEDICATIONS  Reviewed.  See Encounter Summary.     ALLERGIES  No Known Allergies    PHYSICAL EXAM  VITAL SIGNS: /82   Pulse 75   Temp 36.2 °C (97.1 °F) (Temporal)   Resp 19   Ht 1.727 m (5' 8\")   Wt 79 kg (174 lb 2.6 oz)   SpO2 91%   BMI 26.48 kg/m²   Constitutional: Awake, alert in no apparent distress.  Appears uncomfortable.  HENT: Normocephalic, Bilateral external ears normal. Nose normal.   Eyes: Conjunctiva normal, non-icteric, EOMI.    Thorax & Lungs: Easy unlabored respirations, Clear to ascultation bilaterally.  Cardiovascular: Regular rate, Regular rhythm, No murmurs, rubs or gallops. Bilateral pulses symmetrical.   Abdomen:  Soft, there is significant epigastric and left upper quadrant abdominal tenderness, no right upper quadrant tenderness, no lower abdominal tenderness, nondistended, normal active bowel sounds.   :    Skin: Visualized skin is  Dry, No erythema, No rash.   Musculoskeletal:   No cyanosis, clubbing or edema. No leg asymmetry.   Neurologic: Alert, Grossly non-focal.   Psychiatric: Normal affect, Normal mood  Lymphatic:      EKG   12 lead Interpreted by me  Rhythm:  Normal sinus rhythm   Rate: 94  Axis: normal  Ectopy: none  Conduction: normal  ST Segments: no acute change  T Waves: no acute change  Clinical Impression: Normal EKG without acute changes "       RADIOLOGY  I have independently interpreted the diagnostic imaging associated with this visit and am waiting the final reading from the radiologist.   My preliminary interpretation is as follows: No cardiomegaly, no free air.    Radiologist interpretation:   CT-CTA CHEST PULMONARY ARTERY W/ RECONS   Final Result      1.  No CT evidence for pulmonary emboli.   2.  No pneumonia or pneumothorax.   3.  Minimal emphysematous changes.   4.  Small hiatal hernia primarily containing mesenteric fat with several mildly enlarged lymph nodes present.  Fat stranding is seen within the hernia tracking to the lesser curvature of the stomach raising concern for gastric inflammatory or neoplastic    process.               DX-CHEST-PORTABLE (1 VIEW)   Final Result      1.  No acute cardiac or pulmonary abnormalities are identified.          COURSE & MEDICAL DECISION MAKING  Pertinent Labs & Imaging studies reviewed. (See chart for details)    COURSE & MEDICAL DECISION MAKING  Pertinent Labs & Imaging studies reviewed. (See chart for details)    Differential diagnoses include but are not limited to: Peptic ulcer disease, GERD, gastritis, pancreatitis, perforated viscus less likely, ACS, CAP, given reproducible nature, do not suspect PE or dissection.    7:37 AM - Nursing notes reviewed, patient seen and examined at bedside.    12:17 PM: CT findings concerning, case discussed with Dr. Dubose, gastroenterologist who is also concerned.  Plan to hospitalize for EGD tomorrow.    Discussion of management with other medical personnel: GI, hospitalist    Decision tools and prescription drugs considered including, but not limited to: Heart score 3    Decision Making:  This is a pleasant 58 y.o. year old male who presents with significant epigastric pain.  The patient appeared quite uncomfortable, initial cardiac evaluation is unrevealing.  Because of the persistent pain I opted for CT of the chest, mostly to look for subtle findings in  the stomach/free air that may be missed on chest x-ray.  He does not have free air though there is a tiny amount of air in the mediastinum of unclear etiology, he also has some thickening in the antrum as well as a small hiatal hernia with some adjacent fat stranding.  I discussed the case with GI.  This is a 58-year-old male who is a never here in the emergency department with some significant pain and finding suspicious for malignancy.    I considered the possibility of outpatient management; however in the local community, it may take 3 months or longer for him to obtain an outpatient EGD. This would be less than ideal in what potentially could be a aggressive metastatic process.  Therefore we will admit him for EGD tomorrow, consider observation case because once he has the tissue, can follow-up with pathology and oncology follow-up (if positive).  If negative could potentially just go home with PPI.    Patient will be hospitalized in stable condition.    FINAL IMPRESSION  1. Epigastric pain

## 2024-04-02 NOTE — ED TRIAGE NOTES
Iron CHAPMAN Gonzaloarmendahlia  58 y.o. male  Chief Complaint   Patient presents with    Chest Pain    Shortness of Breath     Concern for pulled muscle midline epigastric area. Shoveled snow on Thursday and had pain that began Saturday night. 7/10 pain, difficulty breathing. 98% on RA in triage    Nausea     Gas pressure, unable to eat due to nausea, onset Friday       Pt ambulatory to triage with steady gait for above complaint.     Pt is GCS 15, speaking in full sentences, follows commands and responds appropriately to questions. Resp are even and unlabored.    Chest pain and SOB protocol ordered. Pt placed in draw room hallway. EKG performed. Pt educated on triage process. Pt encouraged to alert staff for any changes.       Vitals:    04/02/24 0703   BP: (!) 163/106   Pulse: 74   Resp: 18   Temp: 36.2 °C (97.1 °F)   SpO2: 98%

## 2024-04-02 NOTE — H&P
Hospital Medicine History & Physical Note    Date of Service  4/2/2024    Primary Care Physician  Pcp Pt States None    Consultants  GI  Specialist Names: Dr. Garcia     Code Status  Full Code    Chief Complaint  Chief Complaint   Patient presents with    Chest Pain    Shortness of Breath     Concern for pulled muscle midline epigastric area. Shoveled snow on Thursday and had pain that began Saturday night. 7/10 pain, difficulty breathing. 98% on RA in triage    Nausea     Gas pressure, unable to eat due to nausea, onset Friday       History of Presenting Illness  Iron Chaudhari is a 58 y.o. male with no PMH presented 4/2/2024 with severe epigastric pain x 3 days - started abruptly in lower chest/upper epigastric area with persistent discomfort. It is worsened laying flat; drinking some milk and water improves the pain but he has not been able to eat much due to nausea and pain. He also reported some difficulty getting food down for months. He initially thought it might be GERD/gastritis but due to not improving symptoms, the patient presented to ED. He also reported SOB with some deep inspiration and intermittent lightheadedness. He takes an occasional Aleve. He has never had an endoscopy or colonoscopy. He denies weight loss (I verified this with patient). He also reported right shoulder pain thought this has been going on for many years.     Pt denies fever, chills, vision changes, chest pain, palpitation, changes in urinary or bowel habits, leg edema. Pt denies recent travel history of sick contacts.     In ED, afebrile, vitals wnl except elevated BP. Exam was grossly unremarkable.   Labs were grossly unremarkable. CTA chest showed no PE, Small hiatal hernia primarily containing mesenteric fat with several mildly enlarged lymph nodes present.  Fat stranding is seen within the hernia tracking to the lesser curvature of the stomach raising concern for gastric inflammatory or neoplastic   process. CXR  personally reviewed was without acute cardiopulm abnormalities.  EKG done was with NSR and no acute ischemic changes.    GI was consulted and Pt was then referred to Hospitalist services for further management.     I discussed the plan of care with patient and GI .    Review of Systems  Review of Systems   Constitutional:  Positive for malaise/fatigue. Negative for chills and fever.   HENT:  Negative for sore throat.    Eyes:  Negative for blurred vision and double vision.   Respiratory:  Negative for cough, sputum production and shortness of breath.    Cardiovascular:  Negative for chest pain, palpitations, orthopnea and leg swelling.   Gastrointestinal:  Positive for abdominal pain, nausea and vomiting. Negative for heartburn.   Genitourinary:  Negative for dysuria, frequency and urgency.   Musculoskeletal:  Positive for joint pain and myalgias. Negative for neck pain.   Neurological:  Negative for dizziness, focal weakness and headaches.   Psychiatric/Behavioral:  Negative for depression.        Past Medical History  No [facility]    Surgical History   has a past surgical history that includes ear middle exploration.     Family History  Family history reviewed with patient. There is no family history that is pertinent to the chief complaint.     Social History   reports that he has quit smoking. His smoking use included cigarettes. He has a 2.5 pack-year smoking history. His smokeless tobacco use includes chew. He reports that he does not currently use alcohol. He reports that he does not use drugs.    Allergies  No Known Allergies    Medications  None       Physical Exam  Temp:  [36.1 °C (96.9 °F)-36.2 °C (97.1 °F)] 36.1 °C (96.9 °F)  Pulse:  [74-94] 78  Resp:  [15-22] 18  BP: ()/() 147/85  SpO2:  [89 %-98 %] 97 %  Blood Pressure: (!) 147/85   Temperature: 36.1 °C (96.9 °F)   Pulse: 78   Respiration: 18   Pulse Oximetry: 97 %       Physical Exam  Vitals and nursing note reviewed.   Constitutional:        "General: He is not in acute distress.     Appearance: Normal appearance. He is not ill-appearing.   HENT:      Head: Normocephalic and atraumatic.      Mouth/Throat:      Mouth: Mucous membranes are moist.      Pharynx: Oropharynx is clear.   Eyes:      General: No scleral icterus.     Conjunctiva/sclera: Conjunctivae normal.   Cardiovascular:      Rate and Rhythm: Normal rate and regular rhythm.      Pulses: Normal pulses.      Heart sounds: Normal heart sounds.   Pulmonary:      Effort: Pulmonary effort is normal. No respiratory distress.      Breath sounds: Normal breath sounds. No wheezing.   Abdominal:      General: Bowel sounds are normal. There is distension (Mildly).      Palpations: Abdomen is soft.      Tenderness: There is abdominal tenderness (in epigastrium; possible lump like structure upon palpation).   Musculoskeletal:         General: No swelling or tenderness.      Comments: Limited ROM on right shoulder above shoulder   Skin:     General: Skin is warm and dry.      Capillary Refill: Capillary refill takes less than 2 seconds.   Neurological:      General: No focal deficit present.      Mental Status: He is alert and oriented to person, place, and time. Mental status is at baseline.   Psychiatric:         Mood and Affect: Mood normal.         Laboratory:  Recent Labs     04/02/24  0742   WBC 10.1   RBC 5.90   HEMOGLOBIN 17.1   HEMATOCRIT 49.7   MCV 84.2   MCH 29.0   MCHC 34.4   RDW 38.3   PLATELETCT 301   MPV 8.6*     Recent Labs     04/02/24  0742   SODIUM 138   POTASSIUM 4.4   CHLORIDE 100   CO2 24   GLUCOSE 115*   BUN 14   CREATININE 1.07   CALCIUM 9.9     Recent Labs     04/02/24  0742 04/02/24  0917   ALTSGPT 19  --    ASTSGOT 18  --    ALKPHOSPHAT 105*  --    TBILIRUBIN 0.8  --    LIPASE 21 17   GLUCOSE 115*  --          No results for input(s): \"NTPROBNP\" in the last 72 hours.      Recent Labs     04/02/24  0742 04/02/24  0917   TROPONINT 19 18       Imaging:  DX-ESOPH. FLUORO (BARIUM " SWALLOW)   Final Result      1.  Moderate esophageal motility.   2.  Small hiatal hernia.   3.  No focal stricture seen.      CT-CTA CHEST PULMONARY ARTERY W/ RECONS   Final Result      1.  No CT evidence for pulmonary emboli.   2.  No pneumonia or pneumothorax.   3.  Minimal emphysematous changes.   4.  Small hiatal hernia primarily containing mesenteric fat with several mildly enlarged lymph nodes present.  Fat stranding is seen within the hernia tracking to the lesser curvature of the stomach raising concern for gastric inflammatory or neoplastic    process.               DX-CHEST-PORTABLE (1 VIEW)   Final Result      1.  No acute cardiac or pulmonary abnormalities are identified.        CXR personally reviewed was without acute cardiopulm abnormalities.  EKG personally done was with NSR and no acute ischemic changes.    Assessment/Plan:  Justification for Admission Status  I anticipate this patient is appropriate for observation status at this time because expect to complete workup for intractable epigastric pain within Obs time frame.     Patient will need a Med/Surg bed on MEDICAL service .  The need is secondary to as above.    * Acute abdominal pain- (present on admission)  Assessment & Plan  In epigastrium   Unable to tolerate po well  Suspicion for obstruction is low at this point  There was also a question about dysphagia   GI consult appreciated    Plan  -Empiric PPI  -Gentle hydration  -Barium esophagram  -EGD in AM  -Supportive care    Acute pain of right shoulder- (present on admission)  Assessment & Plan  Reported pain in right shoulder with limited ROM for months  He felt his shoulder is dislocated  Will get plain X-ray        VTE prophylaxis: pharmacologic prophylaxis contraindicated due to low risk

## 2024-04-02 NOTE — CONSULTS
..GASTROENTEROLOGY CONSULTATION    PATIENT NAME: Iron Chaudhari  : 1965  CSN: [id number]  MRN:  [id number]     CONSULTATION DATE:  2024    PRIMARY CARE PROVIDER:  Pcp Pt States None      Requesting provider: Dr. Darryn Guevara    REASON FOR CONSULT:  Dysphagia, concern for neoplasm on CT    Requesting provider: Dr. Loy Clark    HISTORY OF PRESENT ILLNESS:  Iron Chaudhari is a 58 y.o. male with no significant past medical or surgical history who presents to the ED with significant mid epigastric pain, difficulty swallowing, and associated shortness of breath.  He reports that on Saturday evening, he was moving rocks with a shovel and suddenly felt like something tore in his mid epigastric region.  He also endorses a palpable lump that is tender and elongating.  Later on that evening, he reported that he could not breathe, could not eat, and could not lay flat.  He attempted to drink water Saturday night but was unable to tolerate and vomited it up.  Then he was able to drink milk yesterday.  He had a normal bowel movement prior to this issue.    Patient admits to a similar episode a few year prior where he had what felt like ripping and a lump in his mid epigastric region.  He describes dysphagia over the past several years with rice, bread, and crackers with the feeling of the food getting caught and stuck in his midepigastrum. He also endorses a 40 pound unintentional weight loss over the past year.     He has never had an endoscopy or colonoscopy.  He endorses social alcohol use with 2 or 3 drinks on occasion and a lifelong history of smoking and chewing tobacco intermittently.  He takes an occasional Aleve.  He has never had an endoscopy or colonoscopy.      Labs on admission within normal limits.  CTA completed with findings of a small hiatal hernia primarily containing mesenteric fat within several mildly enlarged lymph nodes, fat stranding within the hernia tracking to the lesser curvature of  the stomach raising concern for gastric inflammatory or neoplastic process.    PAST MEDICAL HISTORY:  History reviewed. No pertinent past medical history.    PAST SURGICAL HISTORY:  Past Surgical History:   Procedure Laterality Date    EAR MIDDLE EXPLORATION          CURRENT MEDS:  Current Facility-Administered Medications   Medication Dose Route Frequency Provider Last Rate Last Admin    NS infusion   Intravenous Continuous Darryn Guevara M.D.        acetaminophen (Tylenol) tablet 650 mg  650 mg Oral Q6HRS PRN Darryn Guevara M.D.        labetalol (Normodyne/Trandate) injection 10 mg  10 mg Intravenous Q4HRS PRN Darryn Guevara M.D.        ondansetron (Zofran) syringe/vial injection 4 mg  4 mg Intravenous Q4HRS PRN Darryn Guevara M.D.        ondansetron (Zofran ODT) dispertab 4 mg  4 mg Oral Q4HRS PRN Darryn Guevara M.D.        promethazine (Phenergan) tablet 12.5-25 mg  12.5-25 mg Oral Q4HRS PRN Darryn Guevara M.D.        promethazine (Phenergan) suppository 12.5-25 mg  12.5-25 mg Rectal Q4HRS PRN Darryn Guevara M.D.        prochlorperazine (Compazine) injection 5-10 mg  5-10 mg Intravenous Q4HRS PRN Darryn Guevara M.D.        guaiFENesin dextromethorphan (Robitussin DM) 100-10 MG/5ML syrup 10 mL  10 mL Oral Q6HRS PRN Darryn Guevara M.D.        Pharmacy Consult Request ...Pain Management Review 1 Each  1 Each Other PHARMACY TO DOSE Darryn Guevara M.D.        oxyCODONE immediate-release (Roxicodone) tablet 2.5 mg  2.5 mg Oral Q3HRS PRN Darryn Guevara M.D.        Or    oxyCODONE immediate-release (Roxicodone) tablet 5 mg  5 mg Oral Q3HRS PRN Darryn Guevara M.D.        Or    HYDROmorphone (Dilaudid) injection 0.25 mg  0.25 mg Intravenous Q3HRS PRN Darryn Guevara M.D.         No current outpatient medications on file.        ALLERGIES:  No Known Allergies    SOCIAL HISTORY:  Social History     Socioeconomic History    Marital status: Single     Spouse name: Not on file    Number of children: Not on file    Years of education: Not on file    Highest education  "level: Not on file   Occupational History    Not on file   Tobacco Use    Smoking status: Former     Current packs/day: 0.25     Average packs/day: 0.3 packs/day for 10.0 years (2.5 ttl pk-yrs)     Types: Cigarettes    Smokeless tobacco: Current     Types: Chew    Tobacco comments:     Chews 3 days/week   Vaping Use    Vaping Use: Never used   Substance and Sexual Activity    Alcohol use: Not Currently    Drug use: No    Sexual activity: Not on file   Other Topics Concern    Not on file   Social History Narrative    Not on file     Social Determinants of Health     Financial Resource Strain: Not on file   Food Insecurity: Not on file   Transportation Needs: Not on file   Physical Activity: Not on file   Stress: Not on file   Social Connections: Not on file   Intimate Partner Violence: Not on file   Housing Stability: Not on file       FAMILY HISTORY:  History reviewed. No pertinent family history.     REVIEW OF SYSTEMS:  General ROS: Positive for weight loss   HEENT ROS: Negative  Respiratory ROS: Negative for - cough or shortness of breath.  Cardiovascular ROS:  Negative for - chest pain or palpitations.  Gastrointestinal ROS: As per the history of present illness.  Genito-Urinary ROS: Negative  Musculoskeletal ROS: Negative.  Neurological ROS: Negative  Skin ROS: negative  Hematology ROS: negative  Endocrinology ROS: Negative        PHYSICAL EXAM:  VITALS: /82   Pulse 75   Temp 36.2 °C (97.1 °F) (Temporal)   Resp 19   Ht 1.727 m (5' 8\")   Wt 79 kg (174 lb 2.6 oz)   SpO2 91%   BMI 26.48 kg/m²   GEN:  Iron Chaudhari is a 58 y.o. male in no acute distress.  HEENT: Mucous membranes pink and moist.  Sclera anicteric.    NECK:    Neck supple without lymphadenopathy or thyromegaly.  LUNGS: Clear to auscultation posteriorly.  HEART: Regular rate and rhythm. S1 and S2 normal. No murmurs, gallops  ABD:  Midepigastric tenderness with palpable lumps from xiphoid process almost to bellybutton.  RECTAL: Not done " "at this time.  EXT:  Without cyanosis, deformity or pitting edema.  SKIN:  Pink, warm, dry.  NEURO: Grossly intact, A/OR.    LABS:  Recent Labs     04/02/24  0742   WBC 10.1   MCV 84.2     Recent Labs     04/02/24  0742   GLUCOSE 115*   BUN 14   CO2 24     No results found for: \"INR\", \"PT\"  No components found for: \"ALT\", \"AST\", \"GGT\", \"ALKPHOS\"  No results found for: \"BILINEO\"      @LASTIMGCAT([id number])@     @LASTIMGCAT([id number])@       IMPRESSION/PLAN:  Dysphagia   Unintended weight loss  CT with suspicion for gastric inflammatory process versus neoplasm  Small hiatal hernia    Recommendations:  Barium swallow with tablet   PPI twice daily  EGD tomorrow   Full liquid diet  NP.o. at midnight    Plan discussed with patient, RN, Dr. Guevara, Dr. Jose Oswald, ESTHELA,  APRN  Gastroenterology      "

## 2024-04-02 NOTE — ED NOTES
Med rec is complete per Patient at bedside.     Allergies reviewed.    Has patient had any outside antibiotics in the last 30 days? N    Any Anticoagulants (rivaroxaban, apixaban, edoxaban, dabigatran, warfarin, enoxaparin) taken in the last 14 days? N         Pharmacy/Pharmacies Pt utilizes : [facility] [phone]

## 2024-04-02 NOTE — ASSESSMENT & PLAN NOTE
Reported pain in right shoulder with limited ROM for months  He felt his shoulder is dislocated  X-ray done was without acute fracture or dislocation; possible rotator cuff pathology   Can follow up outpatient

## 2024-04-02 NOTE — ED NOTES
Report received from BRANDY Dela Cruz; pt a&ox4, resps even and unlabored, vss. Pt notes 4/10 epigastric pain. Plan of care reviewed with pt, admit for further diagnostics for abd pain. Pt agreeable. Pt denies any needs. Report given to CDU BRANDY Calvo, report given to vladimir. Pt transporting to CDU at this time.

## 2024-04-02 NOTE — ED NOTES
Pt reports decrease in pain since pain medication administration.  He appears much more comfortable.

## 2024-04-03 ENCOUNTER — ANESTHESIA (OUTPATIENT)
Dept: SURGERY | Facility: MEDICAL CENTER | Age: 59
End: 2024-04-03

## 2024-04-03 ENCOUNTER — ANESTHESIA EVENT (OUTPATIENT)
Dept: SURGERY | Facility: MEDICAL CENTER | Age: 59
End: 2024-04-03

## 2024-04-03 LAB
ANION GAP SERPL CALC-SCNC: 9 MMOL/L (ref 7–16)
BUN SERPL-MCNC: 16 MG/DL (ref 8–22)
CALCIUM SERPL-MCNC: 8.6 MG/DL (ref 8.5–10.5)
CHLORIDE SERPL-SCNC: 103 MMOL/L (ref 96–112)
CO2 SERPL-SCNC: 24 MMOL/L (ref 20–33)
CREAT SERPL-MCNC: 0.97 MG/DL (ref 0.5–1.4)
ERYTHROCYTE [DISTWIDTH] IN BLOOD BY AUTOMATED COUNT: 38.5 FL (ref 35.9–50)
GFR SERPLBLD CREATININE-BSD FMLA CKD-EPI: 89 ML/MIN/1.73 M 2
GLUCOSE SERPL-MCNC: 101 MG/DL (ref 65–99)
HCT VFR BLD AUTO: 41.6 % (ref 42–52)
HGB BLD-MCNC: 14.3 G/DL (ref 14–18)
MCH RBC QN AUTO: 28.5 PG (ref 27–33)
MCHC RBC AUTO-ENTMCNC: 34.4 G/DL (ref 32.3–36.5)
MCV RBC AUTO: 83 FL (ref 81.4–97.8)
PATHOLOGY CONSULT NOTE: NORMAL
PLATELET # BLD AUTO: 226 K/UL (ref 164–446)
PMV BLD AUTO: 8.3 FL (ref 9–12.9)
POTASSIUM SERPL-SCNC: 4.5 MMOL/L (ref 3.6–5.5)
RBC # BLD AUTO: 5.01 M/UL (ref 4.7–6.1)
SODIUM SERPL-SCNC: 136 MMOL/L (ref 135–145)
WBC # BLD AUTO: 7.5 K/UL (ref 4.8–10.8)

## 2024-04-03 PROCEDURE — 700105 HCHG RX REV CODE 258: Performed by: INTERNAL MEDICINE

## 2024-04-03 PROCEDURE — 700111 HCHG RX REV CODE 636 W/ 250 OVERRIDE (IP): Performed by: NURSE PRACTITIONER

## 2024-04-03 PROCEDURE — 700102 HCHG RX REV CODE 250 W/ 637 OVERRIDE(OP): Performed by: STUDENT IN AN ORGANIZED HEALTH CARE EDUCATION/TRAINING PROGRAM

## 2024-04-03 PROCEDURE — 160048 HCHG OR STATISTICAL LEVEL 1-5: Performed by: INTERNAL MEDICINE

## 2024-04-03 PROCEDURE — 700111 HCHG RX REV CODE 636 W/ 250 OVERRIDE (IP): Performed by: STUDENT IN AN ORGANIZED HEALTH CARE EDUCATION/TRAINING PROGRAM

## 2024-04-03 PROCEDURE — 700105 HCHG RX REV CODE 258: Performed by: STUDENT IN AN ORGANIZED HEALTH CARE EDUCATION/TRAINING PROGRAM

## 2024-04-03 PROCEDURE — 160002 HCHG RECOVERY MINUTES (STAT): Performed by: INTERNAL MEDICINE

## 2024-04-03 PROCEDURE — A9270 NON-COVERED ITEM OR SERVICE: HCPCS | Performed by: STUDENT IN AN ORGANIZED HEALTH CARE EDUCATION/TRAINING PROGRAM

## 2024-04-03 PROCEDURE — 96376 TX/PRO/DX INJ SAME DRUG ADON: CPT

## 2024-04-03 PROCEDURE — 160009 HCHG ANES TIME/MIN: Performed by: INTERNAL MEDICINE

## 2024-04-03 PROCEDURE — G0378 HOSPITAL OBSERVATION PER HR: HCPCS

## 2024-04-03 PROCEDURE — 700111 HCHG RX REV CODE 636 W/ 250 OVERRIDE (IP): Performed by: ANESTHESIOLOGY

## 2024-04-03 PROCEDURE — 80048 BASIC METABOLIC PNL TOTAL CA: CPT

## 2024-04-03 PROCEDURE — 36415 COLL VENOUS BLD VENIPUNCTURE: CPT

## 2024-04-03 PROCEDURE — 160035 HCHG PACU - 1ST 60 MINS PHASE I: Performed by: INTERNAL MEDICINE

## 2024-04-03 PROCEDURE — 99232 SBSQ HOSP IP/OBS MODERATE 35: CPT | Performed by: STUDENT IN AN ORGANIZED HEALTH CARE EDUCATION/TRAINING PROGRAM

## 2024-04-03 PROCEDURE — 160028 HCHG SURGERY MINUTES - 1ST 30 MINS LEVEL 3: Performed by: INTERNAL MEDICINE

## 2024-04-03 PROCEDURE — C9113 INJ PANTOPRAZOLE SODIUM, VIA: HCPCS | Performed by: NURSE PRACTITIONER

## 2024-04-03 PROCEDURE — 85027 COMPLETE CBC AUTOMATED: CPT

## 2024-04-03 PROCEDURE — 88342 IMHCHEM/IMCYTCHM 1ST ANTB: CPT

## 2024-04-03 PROCEDURE — 88305 TISSUE EXAM BY PATHOLOGIST: CPT

## 2024-04-03 PROCEDURE — 700101 HCHG RX REV CODE 250: Performed by: ANESTHESIOLOGY

## 2024-04-03 PROCEDURE — 96375 TX/PRO/DX INJ NEW DRUG ADDON: CPT

## 2024-04-03 PROCEDURE — 43255 EGD CONTROL BLEEDING ANY: CPT | Mod: 59 | Performed by: INTERNAL MEDICINE

## 2024-04-03 PROCEDURE — 43239 EGD BIOPSY SINGLE/MULTIPLE: CPT | Performed by: INTERNAL MEDICINE

## 2024-04-03 RX ORDER — LABETALOL HYDROCHLORIDE 5 MG/ML
5 INJECTION, SOLUTION INTRAVENOUS
Status: DISCONTINUED | OUTPATIENT
Start: 2024-04-03 | End: 2024-04-03 | Stop reason: HOSPADM

## 2024-04-03 RX ORDER — DIPHENHYDRAMINE HYDROCHLORIDE 50 MG/ML
12.5 INJECTION, SOLUTION INTRAMUSCULAR; INTRAVENOUS
Status: DISCONTINUED | OUTPATIENT
Start: 2024-04-03 | End: 2024-04-03 | Stop reason: HOSPADM

## 2024-04-03 RX ORDER — HALOPERIDOL 5 MG/ML
1 INJECTION INTRAMUSCULAR
Status: DISCONTINUED | OUTPATIENT
Start: 2024-04-03 | End: 2024-04-03 | Stop reason: HOSPADM

## 2024-04-03 RX ORDER — SODIUM CHLORIDE, SODIUM LACTATE, POTASSIUM CHLORIDE, CALCIUM CHLORIDE 600; 310; 30; 20 MG/100ML; MG/100ML; MG/100ML; MG/100ML
INJECTION, SOLUTION INTRAVENOUS CONTINUOUS
Status: DISCONTINUED | OUTPATIENT
Start: 2024-04-03 | End: 2024-04-03 | Stop reason: HOSPADM

## 2024-04-03 RX ORDER — HYDRALAZINE HYDROCHLORIDE 20 MG/ML
5 INJECTION INTRAMUSCULAR; INTRAVENOUS
Status: DISCONTINUED | OUTPATIENT
Start: 2024-04-03 | End: 2024-04-03 | Stop reason: HOSPADM

## 2024-04-03 RX ORDER — ALBUTEROL SULFATE 5 MG/ML
2.5 SOLUTION RESPIRATORY (INHALATION)
Status: DISCONTINUED | OUTPATIENT
Start: 2024-04-03 | End: 2024-04-03 | Stop reason: HOSPADM

## 2024-04-03 RX ORDER — LIDOCAINE HYDROCHLORIDE 20 MG/ML
INJECTION, SOLUTION EPIDURAL; INFILTRATION; INTRACAUDAL; PERINEURAL PRN
Status: DISCONTINUED | OUTPATIENT
Start: 2024-04-03 | End: 2024-04-03 | Stop reason: SURG

## 2024-04-03 RX ORDER — SODIUM CHLORIDE, SODIUM LACTATE, POTASSIUM CHLORIDE, CALCIUM CHLORIDE 600; 310; 30; 20 MG/100ML; MG/100ML; MG/100ML; MG/100ML
INJECTION, SOLUTION INTRAVENOUS CONTINUOUS
Status: ACTIVE | OUTPATIENT
Start: 2024-04-03 | End: 2024-04-03

## 2024-04-03 RX ORDER — ONDANSETRON 2 MG/ML
4 INJECTION INTRAMUSCULAR; INTRAVENOUS
Status: DISCONTINUED | OUTPATIENT
Start: 2024-04-03 | End: 2024-04-03 | Stop reason: HOSPADM

## 2024-04-03 RX ORDER — EPHEDRINE SULFATE 50 MG/ML
5 INJECTION, SOLUTION INTRAVENOUS
Status: DISCONTINUED | OUTPATIENT
Start: 2024-04-03 | End: 2024-04-03 | Stop reason: HOSPADM

## 2024-04-03 RX ADMIN — PROPOFOL 30 MG: 10 INJECTION, EMULSION INTRAVENOUS at 10:33

## 2024-04-03 RX ADMIN — PROPOFOL 30 MG: 10 INJECTION, EMULSION INTRAVENOUS at 10:22

## 2024-04-03 RX ADMIN — OXYCODONE 5 MG: 5 TABLET ORAL at 04:30

## 2024-04-03 RX ADMIN — SODIUM CHLORIDE, POTASSIUM CHLORIDE, SODIUM LACTATE AND CALCIUM CHLORIDE: 600; 310; 30; 20 INJECTION, SOLUTION INTRAVENOUS at 10:11

## 2024-04-03 RX ADMIN — PROPOFOL 30 MG: 10 INJECTION, EMULSION INTRAVENOUS at 10:17

## 2024-04-03 RX ADMIN — PROPOFOL 50 MG: 10 INJECTION, EMULSION INTRAVENOUS at 10:14

## 2024-04-03 RX ADMIN — HYDROMORPHONE HYDROCHLORIDE 0.25 MG: 1 INJECTION, SOLUTION INTRAMUSCULAR; INTRAVENOUS; SUBCUTANEOUS at 12:08

## 2024-04-03 RX ADMIN — PROPOFOL 30 MG: 10 INJECTION, EMULSION INTRAVENOUS at 10:29

## 2024-04-03 RX ADMIN — PROPOFOL 30 MG: 10 INJECTION, EMULSION INTRAVENOUS at 10:20

## 2024-04-03 RX ADMIN — OXYCODONE 2.5 MG: 5 TABLET ORAL at 20:27

## 2024-04-03 RX ADMIN — PROPOFOL 30 MG: 10 INJECTION, EMULSION INTRAVENOUS at 10:32

## 2024-04-03 RX ADMIN — PANTOPRAZOLE SODIUM 40 MG: 40 INJECTION, POWDER, LYOPHILIZED, FOR SOLUTION INTRAVENOUS at 04:32

## 2024-04-03 RX ADMIN — OXYCODONE 5 MG: 5 TABLET ORAL at 17:19

## 2024-04-03 RX ADMIN — PANTOPRAZOLE SODIUM 40 MG: 40 INJECTION, POWDER, LYOPHILIZED, FOR SOLUTION INTRAVENOUS at 17:19

## 2024-04-03 RX ADMIN — PROPOFOL 30 MG: 10 INJECTION, EMULSION INTRAVENOUS at 10:18

## 2024-04-03 RX ADMIN — PROPOFOL 30 MG: 10 INJECTION, EMULSION INTRAVENOUS at 10:24

## 2024-04-03 RX ADMIN — PROPOFOL 30 MG: 10 INJECTION, EMULSION INTRAVENOUS at 10:15

## 2024-04-03 RX ADMIN — PROPOFOL 30 MG: 10 INJECTION, EMULSION INTRAVENOUS at 10:26

## 2024-04-03 RX ADMIN — LIDOCAINE HYDROCHLORIDE 100 MG: 20 INJECTION, SOLUTION EPIDURAL; INFILTRATION; INTRACAUDAL at 10:12

## 2024-04-03 RX ADMIN — SODIUM CHLORIDE: 9 INJECTION, SOLUTION INTRAVENOUS at 03:20

## 2024-04-03 ASSESSMENT — PAIN DESCRIPTION - PAIN TYPE
TYPE: ACUTE PAIN
TYPE: SURGICAL PAIN
TYPE: SURGICAL PAIN
TYPE: ACUTE PAIN
TYPE: SURGICAL PAIN
TYPE: ACUTE PAIN

## 2024-04-03 ASSESSMENT — ENCOUNTER SYMPTOMS
PALPITATIONS: 0
SHORTNESS OF BREATH: 0
DEPRESSION: 0
CHILLS: 0
MYALGIAS: 0
BLURRED VISION: 0
VOMITING: 0
SPUTUM PRODUCTION: 0
HEADACHES: 0
COUGH: 0
ORTHOPNEA: 0
FEVER: 0
DOUBLE VISION: 0
NECK PAIN: 0
HEARTBURN: 0
SORE THROAT: 0
FOCAL WEAKNESS: 0
DIZZINESS: 0
ABDOMINAL PAIN: 1
NAUSEA: 1

## 2024-04-03 NOTE — PROGRESS NOTES
4 Eyes Skin Assessment Completed by Gauri, BRANDY and BRANDY Blake.    Head WDL  Ears WDL  Nose WDL  Mouth WDL  Neck WDL  Breast/Chest WDL  Shoulder Blades WDL  Spine WDL Small scar lower mid back.  (R) Arm/Elbow/Hand WDL  (L) Arm/Elbow/Hand WDL  Abdomen WDL  Groin WDL  Scrotum/Coccyx/Buttocks WDL  (R) Leg WDL  (L) Leg WDL  (R) Heel/Foot/Toe WDL  (L) Heel/Foot/Toe WDL          Devices In Places Pulse Ox      Interventions In Place N/A    Possible Skin Injury No    Pictures Uploaded Into Epic N/A  Wound Consult Placed N/A  RN Wound Prevention Protocol Ordered No

## 2024-04-03 NOTE — PROGRESS NOTES
Hospital Medicine Daily Progress Note    Date of Service  4/3/2024    Chief Complaint  Iron Chaudhari is a 58 y.o. male admitted 4/2/2024 with acute abdominal pain    Hospital Course  Iron Chaudhari is a 58 y.o. male with no PMH presented 4/2/2024 with severe epigastric pain x 3 days - started abruptly in lower chest/upper epigastric area with persistent discomfort. It is worsened laying flat; drinking some milk and water improves the pain but he has not been able to eat much due to nausea and pain. He also reported some difficulty getting food down for months. He initially thought it might be GERD/gastritis but due to not improving symptoms, the patient presented to ED. He also reported SOB with some deep inspiration and intermittent lightheadedness. He takes an occasional Aleve. He has never had an endoscopy or colonoscopy. He denies weight loss (I verified this with patient). He also reported right shoulder pain thought this has been going on for many years.      Pt denies fever, chills, vision changes, chest pain, palpitation, changes in urinary or bowel habits, leg edema. Pt denies recent travel history of sick contacts.      In ED, afebrile, vitals wnl except elevated BP. Exam was grossly unremarkable. Labs were grossly unremarkable. CTA chest showed no PE, Small hiatal hernia primarily containing mesenteric fat with several mildly enlarged lymph nodes present.  Fat stranding is seen within the hernia tracking to the lesser curvature of the stomach raising concern for gastric inflammatory or neoplastic process. CXR personally reviewed was without acute cardiopulm abnormalities. EKG done was with NSR and no acute ischemic changes.     GI was consulted and Pt was then referred to Hospitalist services for further management.     Interval Problem Update  4/3  Afebrile and vitals wnl  No acute events overnight   Still have mid epigastric abd pain    Pt underwent EGD - malignant appearing ulcer lesser  curvature fundus, hiatal hernia, antral erosion. Biopsy obtained.     Plan is to monitor overnight for risk of bleeding, PPI and advance diet slowly.     At bedside, Pt wanted to be DC or even leave AMA. We discussed about the plan to monitor bleeding and advancement in diet. He was agreeable to stay.     I have discussed this patient's plan of care and discharge plan at IDT rounds today with Case Management, Nursing, Nursing leadership, and other members of the IDT team.    Consultants/Specialty  GI    Code Status  Full Code    Disposition  The patient is not medically cleared for discharge to home or a post-acute facility.  Anticipate discharge to: home with close outpatient follow-up    I have placed the appropriate orders for post-discharge needs.    Review of Systems  Review of Systems   Constitutional:  Negative for chills, fever and malaise/fatigue.   HENT:  Negative for congestion and sore throat.    Eyes:  Negative for blurred vision and double vision.   Respiratory:  Negative for cough, sputum production and shortness of breath.    Cardiovascular:  Negative for chest pain, palpitations, orthopnea and leg swelling.   Gastrointestinal:  Positive for abdominal pain and nausea. Negative for heartburn and vomiting.   Genitourinary:  Negative for dysuria, frequency and urgency.   Musculoskeletal:  Negative for myalgias and neck pain.   Neurological:  Negative for dizziness, focal weakness and headaches.   Psychiatric/Behavioral:  Negative for depression.         Physical Exam  Temp:  [36.1 °C (97 °F)-37.3 °C (99.2 °F)] 36.5 °C (97.7 °F)  Pulse:  [55-92] 58  Resp:  [12-20] 20  BP: ()/(66-91) 121/83  SpO2:  [92 %-100 %] 92 %    Physical Exam  Vitals and nursing note reviewed.   Constitutional:       General: He is not in acute distress.     Appearance: Normal appearance. He is not ill-appearing.   HENT:      Head: Normocephalic and atraumatic.      Mouth/Throat:      Mouth: Mucous membranes are moist.       Pharynx: Oropharynx is clear.   Eyes:      General: No scleral icterus.     Conjunctiva/sclera: Conjunctivae normal.   Cardiovascular:      Rate and Rhythm: Normal rate and regular rhythm.      Pulses: Normal pulses.      Heart sounds: Normal heart sounds.   Pulmonary:      Effort: Pulmonary effort is normal. No respiratory distress.      Breath sounds: Normal breath sounds. No wheezing.   Abdominal:      General: Bowel sounds are normal. There is no distension.      Palpations: Abdomen is soft.      Tenderness: There is abdominal tenderness (in epigastrium).   Musculoskeletal:         General: No swelling or tenderness. Normal range of motion.   Skin:     General: Skin is warm and dry.      Capillary Refill: Capillary refill takes less than 2 seconds.   Neurological:      General: No focal deficit present.      Mental Status: He is alert and oriented to person, place, and time. Mental status is at baseline.   Psychiatric:         Mood and Affect: Mood normal.         Fluids    Intake/Output Summary (Last 24 hours) at 4/3/2024 1529  Last data filed at 4/3/2024 1039  Gross per 24 hour   Intake 400 ml   Output --   Net 400 ml       Laboratory  Recent Labs     04/02/24  0742 04/03/24  0422   WBC 10.1 7.5   RBC 5.90 5.01   HEMOGLOBIN 17.1 14.3   HEMATOCRIT 49.7 41.6*   MCV 84.2 83.0   MCH 29.0 28.5   MCHC 34.4 34.4   RDW 38.3 38.5   PLATELETCT 301 226   MPV 8.6* 8.3*     Recent Labs     04/02/24  0742 04/03/24  0422   SODIUM 138 136   POTASSIUM 4.4 4.5   CHLORIDE 100 103   CO2 24 24   GLUCOSE 115* 101*   BUN 14 16   CREATININE 1.07 0.97   CALCIUM 9.9 8.6                   Imaging  DX-SHOULDER 2+ RIGHT   Final Result      1.  No fracture or dislocation of RIGHT shoulder.   2.  High riding humeral head suggesting rotator cuff deficiency.      DX-ESOPH. FLUORO (BARIUM SWALLOW)   Final Result      1.  Moderate esophageal motility.   2.  Small hiatal hernia.   3.  No focal stricture seen.      CT-CTA CHEST PULMONARY ARTERY  W/ RECONS   Final Result      1.  No CT evidence for pulmonary emboli.   2.  No pneumonia or pneumothorax.   3.  Minimal emphysematous changes.   4.  Small hiatal hernia primarily containing mesenteric fat with several mildly enlarged lymph nodes present.  Fat stranding is seen within the hernia tracking to the lesser curvature of the stomach raising concern for gastric inflammatory or neoplastic    process.               DX-CHEST-PORTABLE (1 VIEW)   Final Result      1.  No acute cardiac or pulmonary abnormalities are identified.           Assessment/Plan  * Acute abdominal pain- (present on admission)  Assessment & Plan  In epigastrium   Unable to tolerate po well  Suspicion for obstruction is low at this point  Barium esophagram w/o obstruction   GI consult appreciated    Pt underwent EGD - malignant appearing ulcer lesser curvature fundus, hiatal hernia, antral erosion. Biopsy obtained.   Plan is to monitor overnight for risk of bleeding, PPI and advance diet slowly.   -Supportive care    Acute pain of right shoulder- (present on admission)  Assessment & Plan  Reported pain in right shoulder with limited ROM for months  He felt his shoulder is dislocated  X-ray done was without acute fracture or dislocation; possible rotator cuff pathology   Can follow up outpatient          VTE prophylaxis:    pharmacologic prophylaxis contraindicated due to low risk for VTE and high bleeding risk      I have performed a physical exam and reviewed and updated ROS and Plan today (4/3/2024). In review of yesterday's note (4/2/2024), there are no changes except as documented above.

## 2024-04-03 NOTE — ANESTHESIA TIME REPORT
Anesthesia Start and Stop Event Times       Date Time Event    4/3/2024 1001 Ready for Procedure     1011 Anesthesia Start     1040 Anesthesia Stop          Responsible Staff  04/03/24      Name Role Begin End    Rodolfo Molina M.D. Anesth 1011 1040          Overtime Reason:  no overtime (within assigned shift)    Comments:

## 2024-04-03 NOTE — CARE PLAN
The patient is Stable - Low risk of patient condition declining or worsening    Shift Goals  Clinical Goals: To  EGD by 1100  Patient Goals: go home today  Family Goals: N/A    Progress made toward(s) clinical / shift goals:  yes, pt is back from getting done egd. Pt is eager to go home. Dilaudid was given for pain.   Problem: Pain - Standard  Goal: Alleviation of pain or a reduction in pain to the patient’s comfort goal  Outcome: Progressing     Problem: Gastrointestinal Irritability  Goal: Nausea and vomiting will be absent or improve  Outcome: Progressing       Patient is not progressing towards the following goals:

## 2024-04-03 NOTE — PROGRESS NOTES
Report received from BRANDY Oro.  Assumed care of patient.  Assessment complete.  Plan of care gone over with the patient and all concerns addressed.  Patient brought up to the CDU form the ED via transport. Patient A & O  x 4.  No apparent signs of distress.  Safety precautions in place.  Patient educated to call for assistance.  Hourly rounding in place.

## 2024-04-03 NOTE — CARE PLAN
The patient is Stable - Low risk of patient condition declining or worsening    Shift Goals  Clinical Goals: Pain control; NPO at midnight for EGD;  Patient Goals: Pain control; Rest;  Family Goals: N/A    Problem: Pain - Standard  Goal: Alleviation of pain or a reduction in pain to the patient’s comfort goal  Description: Target End Date: 4/3/24    1.  Document pain using the appropriate pain scale per order or unit policy  2.  Educate and implement non-pharmacologic comfort measures (i.e. relaxation, distraction, cold/heat therapy, etc.)  3.  Pain management medications as ordered  4.  Reassess pain after pain med administration per policy  5.  If opiods administered assess patient's response to pain medication is appropriate per POSS sedation scale  Outcome: Progressing     Problem: Gastrointestinal Irritability  Goal: Nausea and vomiting will be absent or improve  Description: Target End Date: 4/3/24    1.  Assess for history, duration, frequency, severity, and potential precipitating factors  2.  Administer antiemetics as ordered  3.  Introduce cold water, ice chips, jenn products, and room temperature broth or bouillon if tolerated and appropriate to the patient's diet  4.  Encourage small amounts of bland, simple foods like broth, Jell-O, crackers or toast if tolerated and appropriate to patient's diet  5.  Position the patient upright while eating and for 1 to 2 hours post-meal  Outcome: Progressing

## 2024-04-03 NOTE — PROGRESS NOTES
Report received from Noc RN. Assume care. Pt resting comfortably  AAOx4.  Assessment completed. VSS. Denies pain, fully ambulatory, able to wiggle toes and dorsi/plantar flex feet, good CMS and pulses to magui LE,   Pt was update for the care for the day. White board updated, All question answered. Pt has call light within reach,  bed is in the lowest position. Pt has no other needs at this time.   0955 Pt off the unit to get EGD done  1150 Pt back to room, report received, per PACU RN pt to stay NPO till 1400 then clears. Pt requesting to go home regardless all post op instructions.   Dr Guevara aware

## 2024-04-03 NOTE — ANESTHESIA PREPROCEDURE EVALUATION
Case: [id number] Date/Time: 04/03/24 1030    Procedure: GASTROSCOPY (Esophagus)    Anesthesia type: MAC    Pre-op diagnosis: Dysphagia, ? mass    Location: [facility] ROOM 26 / SURGERY SAME DAY [facility]    Surgeons: Joanne Garcia M.D.            Relevant Problems   Other   (positive) Acute abdominal pain       Physical Exam    Airway   Mallampati: II  TM distance: >3 FB  Neck ROM: full       Cardiovascular - normal exam  Rhythm: regular  Rate: normal  (-) murmur     Dental - normal exam  (+) upper dentures, lower dentures           Pulmonary - normal exam  Breath sounds clear to auscultation     Abdominal    Neurological - normal exam                   Anesthesia Plan    ASA 2       Plan - general and MAC               Induction: intravenous    Postoperative Plan: Postoperative administration of opioids is intended.    Pertinent diagnostic labs and testing reviewed    Informed Consent:    Anesthetic plan and risks discussed with patient.    Use of blood products discussed with: patient whom consented to blood products.

## 2024-04-03 NOTE — PROGRESS NOTES
Patient Aox4 and seen sitting up in bed.  Plan of care discussed. Anticipate NPO status at midnight for EGD in the morning. Will also continue to work on pain control. Patient verbalizes understanding.  Patient reporting epigastric pain this evening. PRN pain medication administered per MAR. No complaints of nausea/vomiting at this time.   IV fluids infusing per order.   Patient JENKINS and is ambulatory with SBA. Instructed patient to call for assistance prior to ambulation if needed. Patient verbalizes understanding.   All questions answered at this time.  Call light and personal belongings within reach, bed locked and in lowest position and hourly rounding in place.

## 2024-04-03 NOTE — OR NURSING
1037 Patient arrived to PACU. Report received from anesthesia and OR RN. Patient on 6L of oxygen via mask. Placed on monitor.. Oral airway in place.     1047 [facility] [place] Dc'ed. Dr. Garcia at bedside and updated patient.     1055 Patient denies any needs, dentures placed back in my patient. Transitioned to room air.     1110 Report given to Yvette NAVA.     1133 Patient with transport back to room.

## 2024-04-03 NOTE — PROCEDURES
OPERATIVE REPORT    PATIENT:   Iron Chaudhari   1965       PREOPERATIVE DIAGNOSES/INDICATIONS: dysphagia, unintentional weight loss, CT scan with suspicion for gastric inflammatory process    POSTOPERATIVE DIAGNOSIS: malignant appearing ulcer lesser curvature fundus, hiatal hernia, antral erosion    PROCEDURE:  ESOPHAGOGASTRODUODENOSCOPY with control of bleeding, biopsies    PHYSICIAN:  Joanne Garcia MD    ANESTHESIA:  Per anesthesiologist.    LOCATION: [facility]    CONSENT:  OBTAINED. The risks, benefits and alternatives of the procedure were discussed in details. The risks include and are not limited to bleeding, infection, perforation, missed lesions, and sedations risks (cardiopulmonary compromise and allergic reaction to medications).    DESCRIPTION: The patient presented to the procedure room.  After routine checkup was performed, patient was brought into the endoscopy suite.  Patient was placed on his left lateral decubitus position. A bite block was placed in patient's mouth. Patient was sedated by anesthesia.  Vital signs were monitored throughout the procedure.  Oxygenation support was provided throughout the procedure. Upper endoscope was inserted into patient's mouth and advanced to the second portion of the duodenum under direct visualization.      Once the site was reached and examined, the upper endoscope was withdrawn.  Retroflexion was made within the stomach.  The stomach was decompressed, scope was withdrawn and the procedure was terminated.     The patient tolerated the procedure well.  There were no immediate complications.    OPERATIVE FINDINGS:    1. Esophagus: normal    2. Stomach: hiatal hernia 2-3 cm.    --Superiorly just below diaphragmatic hiatus was malignant appearing ulcer with pigmented spot.  Edges were prominent.  Length was 4 cm.  Gold probe 7Fr to pigmented spot.  No bleeding with treatment.  Then took multiple biopsies of edges of ulcer.  Applied Endoclot to minimize  any bleeding from biopsies and treated area within ulcer.   --Erosion at pylorus.  Biopsied for H pylori (later appreciated ulcer)    3. Duodenum: normal    RECOMMENDATIONS:  Follow up path  Cl liq diet this evening, adv in am  Continue PPI

## 2024-04-03 NOTE — CARE PLAN
Problem: Pain - Standard  Goal: Alleviation of pain or a reduction in pain to the patient’s comfort goal  Outcome: Progressing     Problem: Knowledge Deficit - Standard  Goal: Patient and family/care givers will demonstrate understanding of plan of care, disease process/condition, diagnostic tests and medications  Outcome: Progressing   The patient is Stable - Low risk of patient condition declining or worsening         Progress made toward(s) clinical / shift goals:  Pt is able to verbalize his plan of care.    Patient is not progressing towards the following goals: N/A

## 2024-04-03 NOTE — ANESTHESIA POSTPROCEDURE EVALUATION
Patient: Iron Chaudhari    Procedure Summary       Date: 04/03/24 Room / Location: [facility] ROOM 26 / SURGERY SAME DAY [facility]    Anesthesia Start: 1011 Anesthesia Stop: 1040    Procedures:       GASTROSCOPY (Esophagus)      GASTROSCOPY, WITH BIOPSY (Esophagus)      EGD, WITH CAUTERIZATION (Esophagus)      CONTROL OF HEMORRHAGE (Esophagus) Diagnosis: (malignant appearing ulcer)    Surgeons: Joanne Garcia M.D. Responsible Provider: Rodolfo Molina M.D.    Anesthesia Type: general, MAC ASA Status: 2            Final Anesthesia Type: general, MAC  Last vitals  BP   Blood Pressure: (!) 151/91    Temp   36.2 °C (97.1 °F)    Pulse   63   Resp   15    SpO2   94 %      Anesthesia Post Evaluation    Patient location during evaluation: PACU  Patient participation: complete - patient participated  Level of consciousness: awake and alert    Airway patency: patent  Anesthetic complications: no  Cardiovascular status: hemodynamically stable  Respiratory status: acceptable  Hydration status: euvolemic    PONV: none          No notable events documented.     Nurse Pain Score: 7 (NPRS)

## 2024-04-03 NOTE — PROGRESS NOTES
Assumed care from Yvette NAVA, bedside report received. Viewed previous assessment and agree with the findings.

## 2024-04-04 ENCOUNTER — PHARMACY VISIT (OUTPATIENT)
Dept: PHARMACY | Facility: MEDICAL CENTER | Age: 59
End: 2024-04-04
Payer: COMMERCIAL

## 2024-04-04 VITALS
BODY MASS INDEX: 25.99 KG/M2 | DIASTOLIC BLOOD PRESSURE: 68 MMHG | HEIGHT: 68 IN | TEMPERATURE: 97.8 F | OXYGEN SATURATION: 89 % | WEIGHT: 171.52 LBS | RESPIRATION RATE: 16 BRPM | SYSTOLIC BLOOD PRESSURE: 114 MMHG | HEART RATE: 65 BPM

## 2024-04-04 PROBLEM — K25.3 ACUTE GASTRIC ULCER WITHOUT HEMORRHAGE OR PERFORATION: Status: ACTIVE | Noted: 2024-04-04

## 2024-04-04 PROCEDURE — RXMED WILLOW AMBULATORY MEDICATION CHARGE: Performed by: STUDENT IN AN ORGANIZED HEALTH CARE EDUCATION/TRAINING PROGRAM

## 2024-04-04 PROCEDURE — C9113 INJ PANTOPRAZOLE SODIUM, VIA: HCPCS | Performed by: NURSE PRACTITIONER

## 2024-04-04 PROCEDURE — 96376 TX/PRO/DX INJ SAME DRUG ADON: CPT

## 2024-04-04 PROCEDURE — 700111 HCHG RX REV CODE 636 W/ 250 OVERRIDE (IP): Performed by: NURSE PRACTITIONER

## 2024-04-04 PROCEDURE — 99232 SBSQ HOSP IP/OBS MODERATE 35: CPT | Performed by: NURSE PRACTITIONER

## 2024-04-04 PROCEDURE — 99239 HOSP IP/OBS DSCHRG MGMT >30: CPT | Performed by: STUDENT IN AN ORGANIZED HEALTH CARE EDUCATION/TRAINING PROGRAM

## 2024-04-04 PROCEDURE — G0378 HOSPITAL OBSERVATION PER HR: HCPCS

## 2024-04-04 RX ORDER — PANTOPRAZOLE SODIUM 40 MG/1
40 TABLET, DELAYED RELEASE ORAL DAILY
Qty: 30 TABLET | Refills: 1 | Status: SHIPPED | OUTPATIENT
Start: 2024-04-04 | End: 2024-08-09

## 2024-04-04 RX ADMIN — PANTOPRAZOLE SODIUM 40 MG: 40 INJECTION, POWDER, LYOPHILIZED, FOR SOLUTION INTRAVENOUS at 05:28

## 2024-04-04 ASSESSMENT — ENCOUNTER SYMPTOMS
DIARRHEA: 0
NERVOUS/ANXIOUS: 0
FLANK PAIN: 0
HEADACHES: 0
WEAKNESS: 0
NAUSEA: 0
ABDOMINAL PAIN: 0
FEVER: 0
FALLS: 0
SORE THROAT: 0
MYALGIAS: 0
VOMITING: 0
SINUS PAIN: 0
INSOMNIA: 0
CONSTIPATION: 0
COUGH: 0
SHORTNESS OF BREATH: 0

## 2024-04-04 ASSESSMENT — PAIN DESCRIPTION - PAIN TYPE: TYPE: ACUTE PAIN

## 2024-04-04 NOTE — CARE PLAN
The patient is Stable - Low risk of patient condition declining or worsening    Shift Goals  Clinical Goals: To  EGD by 1100  Patient Goals: go home today  Family Goals: N/A    Progress made toward(s) clinical / shift goals:    Problem: Pain - Standard  Goal: Alleviation of pain or a reduction in pain to the patient’s comfort goal  Outcome: Progressing     Problem: Knowledge Deficit - Standard  Goal: Patient and family/care givers will demonstrate understanding of plan of care, disease process/condition, diagnostic tests and medications  Outcome: Progressing     Problem: Nutrition  Goal: Patient's nutritional and fluid intake will be adequate or improve  Outcome: Progressing     Problem: Gastrointestinal Irritability  Goal: Nausea and vomiting will be absent or improve  Outcome: Progressing

## 2024-04-04 NOTE — DISCHARGE SUMMARY
Discharge Summary    CHIEF COMPLAINT ON ADMISSION  Chief Complaint   Patient presents with    Chest Pain    Shortness of Breath     Concern for pulled muscle midline epigastric area. Shoveled snow on Thursday and had pain that began Saturday night. 7/10 pain, difficulty breathing. 98% on RA in triage    Nausea     Gas pressure, unable to eat due to nausea, onset Friday       Reason for Admission  Acute intractable abdominal pain     Admission Date  4/2/2024    CODE STATUS  Full Code    HPI & HOSPITAL COURSE  Iron Chaudhari is a 58 y.o. male with no PMH presented 4/2/2024 with severe epigastric pain x 3 days - started abruptly in lower chest/upper epigastric area with persistent discomfort. It is worsened laying flat; drinking some milk and water improves the pain but he has not been able to eat much due to nausea and pain. He also reported some difficulty getting food down for months. He initially thought it might be GERD/gastritis but due to not improving symptoms, the patient presented to ED. He also reported SOB with some deep inspiration and intermittent lightheadedness. He takes an occasional Aleve. He has never had an endoscopy or colonoscopy. He denies weight loss (I verified this with patient). He also reported right shoulder pain thought this has been going on for many years.      Pt denies fever, chills, vision changes, chest pain, palpitation, changes in urinary or bowel habits, leg edema. Pt denies recent travel history of sick contacts.      In ED, afebrile, vitals wnl except elevated BP. Exam was grossly unremarkable. Labs were grossly unremarkable. CTA chest showed no PE, Small hiatal hernia primarily containing mesenteric fat with several mildly enlarged lymph nodes present.  Fat stranding is seen within the hernia tracking to the lesser curvature of the stomach raising concern for gastric inflammatory or neoplastic process. CXR personally reviewed was without acute cardiopulm abnormalities. EKG  done was with NSR and no acute ischemic changes.     GI was consulted and Pt was then referred to Hospitalist services for further management.     4/3: Afebrile and vitals wnl. No acute events overnight. Still have mid epigastric abd pain. Pt underwent EGD - malignant appearing ulcer lesser curvature fundus, hiatal hernia, antral erosion. Biopsy obtained. Plan was to monitor overnight for risk of bleeding, PPI and advance diet slowly.     4/4 today: no acute complain overnight with adequate pain control. Stable vitals and no reported melena or blood in the stool. At this point, deemed stable for DC with a close outpatient follow up for biopsy result. PPI daily recommended.     Therefore, he is discharged in fair and stable condition to home with close outpatient follow-up.    The patient recovered much more quickly than anticipated on admission.    Discharge Date  04/04/24    FOLLOW UP ITEMS POST DISCHARGE  Biopsy result    DISCHARGE DIAGNOSES  Principal Problem:    Acute abdominal pain (POA: Yes)  Active Problems:    Acute pain of right shoulder (POA: Yes)    Acute gastric ulcer without hemorrhage or perforation (POA: Yes)  Resolved Problems:    * No resolved hospital problems. *      FOLLOW UP  Please follow up with your primary care physician within 1 to 2 weeks of discharge. Tele health if available.      MEDICATIONS ON DISCHARGE     Medication List        START taking these medications        Instructions   pantoprazole 40 MG Tbec  Commonly known as: Protonix   Take 1 Tablet by mouth every day.  Dose: 40 mg              Allergies  No Known Allergies    DIET  Regular      ACTIVITY  As tolerated.  Weight bearing as tolerated    CONSULTATIONS  GI    PROCEDURES  EGD    LABORATORY  Lab Results   Component Value Date    SODIUM 136 04/03/2024    POTASSIUM 4.5 04/03/2024    CHLORIDE 103 04/03/2024    CO2 24 04/03/2024    GLUCOSE 101 (H) 04/03/2024    BUN 16 04/03/2024    CREATININE 0.97 04/03/2024        Lab Results    Component Value Date    WBC 7.5 04/03/2024    HEMOGLOBIN 14.3 04/03/2024    HEMATOCRIT 41.6 (L) 04/03/2024    PLATELETCT 226 04/03/2024        Total time of the discharge process exceeds 36 minutes.

## 2024-04-04 NOTE — DISCHARGE PLANNING
Meds-to-Beds: Discharge prescription orders listed below delivered to patient in discharge lounge.  RN notified. Patient counseled. Patient elected to have payment billed to patient account (will call us back if he has Medicaid).      Current Outpatient Medications   Medication Sig Dispense Refill    pantoprazole (PROTONIX) 40 MG Tablet Delayed Response Take 1 Tablet by mouth every day. 30 Tablet 1      Chyna Hamilton, PharmD

## 2024-04-04 NOTE — PROGRESS NOTES
Gastroenterology Progress Note               Author:  BRYANT Davis Date & Time Created: 4/4/2024 11:45 AM       Patient ID:  Name:             Iron Chaudhari  YOB: 1965  Age:                 58 y.o.  male  MRN:               [id number]        Medical Decision Making, by Problem:  Active Hospital Problems    Diagnosis     Acute gastric ulcer without hemorrhage or perforation [K25.3]     Acute abdominal pain [R10.9]     Acute pain of right shoulder [M25.511]            Presenting Chief Complaint:  Dysphagia, concern for neoplasm on CT     Requesting provider: Dr. Loy Clark     HISTORY OF PRESENT ILLNESS:  Iron Chaudhari is a 58 y.o. male with no significant past medical or surgical history who presents to the ED with significant mid epigastric pain, difficulty swallowing, and associated shortness of breath.  He reports that on Saturday evening, he was moving rocks with a shovel and suddenly felt like something tore in his mid epigastric region.  He also endorses a palpable lump that is tender and elongating.  Later on that evening, he reported that he could not breathe, could not eat, and could not lay flat.  He attempted to drink water Saturday night but was unable to tolerate and vomited it up.  Then he was able to drink milk yesterday.  He had a normal bowel movement prior to this issue.     Patient admits to a similar episode a few year prior where he had what felt like ripping and a lump in his mid epigastric region.  He describes dysphagia over the past several years with rice, bread, and crackers with the feeling of the food getting caught and stuck in his midepigastrum. He also endorses a 40 pound unintentional weight loss over the past year.      He has never had an endoscopy or colonoscopy.  He endorses social alcohol use with 2 or 3 drinks on occasion and a lifelong history of smoking and chewing tobacco intermittently.  He takes an occasional Aleve.  He has never had an  endoscopy or colonoscopy.       Labs on admission within normal limits.  CTA completed with findings of a small hiatal hernia primarily containing mesenteric fat within several mildly enlarged lymph nodes, fat stranding within the hernia tracking to the lesser curvature of the stomach raising concern for gastric inflammatory or neoplastic process.         Interval History:  4/4/2024: Patient seen at bedside.  Reports feeling well without nausea, vomiting, abdominal pain, melena, medic easier, hematemesis tolerating oral intake without difficulty.  Findings of EGD discussed with patient.  He is eager for discharge.        Hospital Medications:  Current Facility-Administered Medications   Medication Dose Frequency Provider Last Rate Last Admin    acetaminophen (Tylenol) tablet 650 mg  650 mg Q6HRS PRN Darryn Guevara M.D.        labetalol (Normodyne/Trandate) injection 10 mg  10 mg Q4HRS PRN Darryn Guevara M.D.        ondansetron (Zofran) syringe/vial injection 4 mg  4 mg Q4HRS PRN Darryn Guevara M.D.        ondansetron (Zofran ODT) dispertab 4 mg  4 mg Q4HRS PRN Darryn Guevara M.D.        promethazine (Phenergan) tablet 12.5-25 mg  12.5-25 mg Q4HRS PRN Darryn Guevaar M.D.        promethazine (Phenergan) suppository 12.5-25 mg  12.5-25 mg Q4HRS PRN Darryn Guevara M.D.        prochlorperazine (Compazine) injection 5-10 mg  5-10 mg Q4HRS PRN Darryn Guevara M.D.        guaiFENesin dextromethorphan (Robitussin DM) 100-10 MG/5ML syrup 10 mL  10 mL Q6HRS PRN Darryn Guevara M.D.        Pharmacy Consult Request ...Pain Management Review 1 Each  1 Each PHARMACY TO DOSE Darryn Guevara M.D.        oxyCODONE immediate-release (Roxicodone) tablet 2.5 mg  2.5 mg Q3HRS PRN Darryn Guevara M.D.   2.5 mg at 04/03/24 2027    Or    oxyCODONE immediate-release (Roxicodone) tablet 5 mg  5 mg Q3HRS PRN Darryn Guevara M.D.   5 mg at 04/03/24 1719    Or    HYDROmorphone (Dilaudid) injection 0.25 mg  0.25 mg Q3HRS PRN Darryn Guevara M.D.   0.25 mg at 04/03/24 1208    pantoprazole  "(Protonix) injection 40 mg  40 mg BID Yael Oswald, DNP,  APRN   40 mg at 04/04/24 0528   Last reviewed on 4/2/2024 12:55 PM by Nirmala Miranda T       Review of Systems:  Review of Systems   Constitutional:  Negative for fever and malaise/fatigue.   HENT:  Negative for hearing loss, sinus pain and sore throat.    Respiratory:  Negative for cough and shortness of breath.    Cardiovascular:  Negative for chest pain and leg swelling.   Gastrointestinal:  Negative for abdominal pain, constipation, diarrhea, melena, nausea and vomiting.   Genitourinary:  Negative for dysuria and flank pain.   Musculoskeletal:  Negative for falls and myalgias.   Neurological:  Negative for weakness and headaches.   Psychiatric/Behavioral:  The patient is not nervous/anxious and does not have insomnia.    All other systems reviewed and are negative.        Vital signs:  Weight/BMI: Body mass index is 26.08 kg/m².  /68   Pulse 65   Temp 36.6 °C (97.8 °F) (Temporal)   Resp 16   Ht 1.727 m (5' 8\")   Wt 77.8 kg (171 lb 8.3 oz)   SpO2 89%   Vitals:    04/03/24 1719 04/03/24 1924 04/04/24 0400 04/04/24 0714   BP:  (!) 150/79 135/87 114/68   Pulse:  79 81 65   Resp: 18 16 18 16   Temp:  36.3 °C (97.3 °F) 36.2 °C (97.2 °F) 36.6 °C (97.8 °F)   TempSrc:  Temporal Temporal Temporal   SpO2:  93% 91% 89%   Weight:       Height:         Oxygen Therapy:  Pulse Oximetry: 89 %, O2 (LPM): 0, O2 Delivery Device: None - Room Air    Intake/Output Summary (Last 24 hours) at 4/4/2024 1145  Last data filed at 4/4/2024 0936  Gross per 24 hour   Intake 120 ml   Output --   Net 120 ml         Physical Exam:  Physical Exam  Vitals and nursing note reviewed.   Constitutional:       General: He is awake.      Appearance: Normal appearance. He is well-developed, well-groomed and overweight.   HENT:      Head: Normocephalic.      Nose: Nose normal. No congestion.      Mouth/Throat:      Mouth: Mucous membranes are moist.      Pharynx: Oropharynx " is clear. No oropharyngeal exudate.   Eyes:      General: No scleral icterus.     Extraocular Movements: Extraocular movements intact.      Conjunctiva/sclera: Conjunctivae normal.   Cardiovascular:      Rate and Rhythm: Normal rate and regular rhythm.      Pulses: Normal pulses.      Heart sounds: Normal heart sounds.   Pulmonary:      Effort: Pulmonary effort is normal.      Breath sounds: Normal breath sounds.   Abdominal:      General: Abdomen is flat. Bowel sounds are normal. There is no distension.      Palpations: Abdomen is soft.      Tenderness: There is no abdominal tenderness. There is no guarding.   Musculoskeletal:      Right lower leg: No edema.      Left lower leg: No edema.   Skin:     General: Skin is warm and dry.      Capillary Refill: Capillary refill takes less than 2 seconds.      Coloration: Skin is not jaundiced or pale.   Neurological:      General: No focal deficit present.      Mental Status: He is alert and oriented to person, place, and time. Mental status is at baseline.   Psychiatric:         Mood and Affect: Mood normal.         Behavior: Behavior normal. Behavior is cooperative.         Thought Content: Thought content normal.         Judgment: Judgment normal.             Labs:  Recent Labs     04/02/24 0742 04/03/24 0422   SODIUM 138 136   POTASSIUM 4.4 4.5   CHLORIDE 100 103   CO2 24 24   BUN 14 16   CREATININE 1.07 0.97   CALCIUM 9.9 8.6     Recent Labs     04/02/24 0742 04/02/24  0917 04/03/24 0422   ALTSGPT 19  --   --    ASTSGOT 18  --   --    ALKPHOSPHAT 105*  --   --    TBILIRUBIN 0.8  --   --    LIPASE 21 17  --    GLUCOSE 115*  --  101*     Recent Labs     04/02/24 0742 04/03/24 0422   WBC 10.1 7.5   NEUTSPOLYS 67.00  --    LYMPHOCYTES 21.80*  --    MONOCYTES 7.80  --    EOSINOPHILS 1.90  --    BASOPHILS 1.00  --    ASTSGOT 18  --    ALTSGPT 19  --    ALKPHOSPHAT 105*  --    TBILIRUBIN 0.8  --      Recent Labs     04/02/24 0742 04/03/24 0422   RBC 5.90 5.01    HEMOGLOBIN 17.1 14.3   HEMATOCRIT 49.7 41.6*   PLATELETCT 301 226     No results found for this or any previous visit (from the past 24 hour(s)).    Radiology Review:  DX-SHOULDER 2+ RIGHT   Final Result      1.  No fracture or dislocation of RIGHT shoulder.   2.  High riding humeral head suggesting rotator cuff deficiency.      DX-ESOPH. FLUORO (BARIUM SWALLOW)   Final Result      1.  Moderate esophageal motility.   2.  Small hiatal hernia.   3.  No focal stricture seen.      CT-CTA CHEST PULMONARY ARTERY W/ RECONS   Final Result      1.  No CT evidence for pulmonary emboli.   2.  No pneumonia or pneumothorax.   3.  Minimal emphysematous changes.   4.  Small hiatal hernia primarily containing mesenteric fat with several mildly enlarged lymph nodes present.  Fat stranding is seen within the hernia tracking to the lesser curvature of the stomach raising concern for gastric inflammatory or neoplastic    process.               DX-CHEST-PORTABLE (1 VIEW)   Final Result      1.  No acute cardiac or pulmonary abnormalities are identified.            MDM (Data Review):   -Records reviewed and summarized in current documentation  -I personally reviewed and interpreted the laboratory results  -I personally reviewed the radiology images    Assessment/Recommendations:  Malignant appearing gastric ulcer  Antral erosion  Dysphagia   Unintended weight loss  CT with suspicion for gastric inflammatory process versus neoplasm  Small hiatal hernia       MDM:  This is a 58-year-old male with a past medical history as listed above who presented to [facility] on 4/2/2024 with abdominal pain.  GI was consulted for dysphagia, unintentional weight loss, CT scan with suspicion for gastric inflammatory process.  Patient underwent EGD on 4/3/2024 during which time he was found to have a malignant appearing ulcer with pigmented spot superiorly just below the diaphragmatic hiatus measuring approximate 4 cm.  Edges are  prominent.  Gold probe treatment to pigmented spot.  Biopsies obtained of vaginal ulcer and Endoclot sprayed.  Postprocedurally, patient AAOx4.  Reports doing well without nausea, vomiting, abdominal pain, hematochezia, melena.  Tolerating oral intake without difficulty.  EGD findings discussed patient as well as pending biopsies.  Questions/concerns addressed.    Plan:  Diet as tolerated  PPI discharge  GI to follow pathology    No further interventions from acute GI service. GI to sign off. Please reconsult for any further questions or concerns.      Discussed with patient, Dr. Guevara, Dr. Dubose    Core Quality Measures   Reviewed items::  Labs, Medications and Radiology reports reviewed

## 2024-04-04 NOTE — PROGRESS NOTES
Patient left [facility] without receiving discharge instructions. Patient contacted via telephone and stated IV was removed and he would return for discharge paperwork. Meds to beds in patient's possession.

## 2024-04-04 NOTE — DISCHARGE INSTRUCTIONS
Discharge Instructions    Discharged to home by car with relative. Discharged via wheelchair, hospital escort: Yes.  Special equipment needed: Not Applicable    Be sure to schedule a follow-up appointment with your primary care doctor or any specialists as instructed.     Discharge Plan:        I understand that a diet low in cholesterol, fat, and sodium is recommended for good health. Unless I have been given specific instructions below for another diet, I accept this instruction as my diet prescription.   Other diet: Regular    Special Instructions: None    -Is this patient being discharged with medication to prevent blood clots?  No    Is patient discharged on Warfarin / Coumadin?   No

## 2024-04-04 NOTE — CARE PLAN
The patient is Stable - Low risk of patient condition declining or worsening    Shift Goals  Clinical Goals: Monitor v/s, pain management, monitor for bleeding  Patient Goals: Pain management  Family Goals: [place]    Progress made toward(s) clinical / shift goals:      Problem: Pain - Standard  Goal: Alleviation of pain or a reduction in pain to the patient’s comfort goal  Description: Target End Date:  Prior to discharge or change in level of care    Document on Vitals flowsheet    1.  Document pain using the appropriate pain scale per order or unit policy  2.  Educate and implement non-pharmacologic comfort measures (i.e. relaxation, distraction, massage, cold/heat therapy, etc.)  3.  Pain management medications as ordered  4.  Reassess pain after pain med administration per policy  5.  If opiods administered assess patient's response to pain medication is appropriate per POSS sedation scale  6.  Follow pain management plan developed in collaboration with patient and interdisciplinary team (including palliative care or pain specialists if applicable)  4/4/2024 0223 by Stacia Mariee R.N.  Outcome: Progressing  4/4/2024 0223 by Stacia Mariee RChastityN.  Outcome: Progressing  4/4/2024 0223 by Stacia Mariee RFELICIA.  Outcome: Progressing     Problem: Knowledge Deficit - Standard  Goal: Patient and family/care givers will demonstrate understanding of plan of care, disease process/condition, diagnostic tests and medications  Description: Target End Date:  1-3 days or as soon as patient condition allows    Document in Patient Education    1.  Patient and family/caregiver oriented to unit, equipment, visitation policy and means for communicating concern  2.  Complete/review Learning Assessment  3.  Assess knowledge level of disease process/condition, treatment plan, diagnostic tests and medications  4.  Explain disease process/condition, treatment plan, diagnostic tests and medications  4/4/2024 0223 by Stacia ÁLVAREZ  SRAVANI Mariee  Outcome: Progressing  4/4/2024 0223 by Stacia Mariee R.N.  Outcome: Progressing  4/4/2024 0223 by Stacia Mariee R.N.  Outcome: Progressing     Problem: Nutrition  Goal: Patient's nutritional and fluid intake will be adequate or improve  Description: Target End Date:  Prior to discharge or change in level of care    Document on I/O flowsheet    1.  Monitor nutritional intake  2.  Monitor weight per provider order  3.  Assess patient's ability to take oral nutrition  4.  Collaborate with Speech Therapy, Dietitian and interdisciplinary team for appropriate feeding and fluid intake  5.  Assist with feeding  4/4/2024 0223 by Stacia Mariee R.N.  Outcome: Progressing  4/4/2024 0223 by Stacia Mariee R.N.  Outcome: Progressing  4/4/2024 0223 by Stacia Mariee R.N.  Outcome: Progressing     Problem: Gastrointestinal Irritability  Goal: Nausea and vomiting will be absent or improve  Description: Target End Date:  Prior to discharge or change in level of care    Document on I/O and Assessment flowsheets    1.  Assess for history, duration, frequency, severity, and potential precipitating factors  2.  Administer antiemetics as ordered  3.  Emesis basin within easy reach of the patient, but out of sight if psychogenic component  4.  Eliminate strong odors from surroundings  5.  Introduce cold water, ice chips, jenn products, and room temperature broth or bouillon if tolerated and appropriate to the patient's diet  6.  Encourage small amounts of bland, simple foods like broth, rice, bananas, Jell-O, crackers or toast if tolerated and appropriate to patient's diet  7.  Position the patient upright while eating and for 1 to 2 hours post-meal  8.  Encourage nonpharmacological nausea control techniques such as relaxation, guided imagery, music therapy, distraction, or deep breathing exercises  4/4/2024 0223 by Stacia Mariee R.N.  Outcome: Progressing  4/4/2024 0223 by Stacia ÁLVAREZ  SRAVANI Mariee  Outcome: Progressing  4/4/2024 0223 by Stacia Mariee R.N.  Outcome: Progressing

## 2024-04-04 NOTE — PROGRESS NOTES
"Pt requesting to get discharged at this time or to leave AMA. Pt refused lab draws stating he \"would like to go home now\". Educated patient the need to wait for the hospitalist to discharge this AM, pt agreed to wait to talk with doctor before leaving.   "

## 2024-08-09 ENCOUNTER — OFFICE VISIT (OUTPATIENT)
Dept: MEDICAL GROUP | Facility: MEDICAL CENTER | Age: 59
End: 2024-08-09
Payer: MEDICAID

## 2024-08-09 ENCOUNTER — PHARMACY VISIT (OUTPATIENT)
Dept: PHARMACY | Facility: MEDICAL CENTER | Age: 59
End: 2024-08-09
Payer: COMMERCIAL

## 2024-08-09 VITALS
HEART RATE: 59 BPM | SYSTOLIC BLOOD PRESSURE: 120 MMHG | HEIGHT: 67 IN | TEMPERATURE: 96.3 F | RESPIRATION RATE: 16 BRPM | OXYGEN SATURATION: 95 % | WEIGHT: 173.5 LBS | DIASTOLIC BLOOD PRESSURE: 70 MMHG | BODY MASS INDEX: 27.23 KG/M2

## 2024-08-09 DIAGNOSIS — B96.81 H PYLORI ULCER: ICD-10-CM

## 2024-08-09 DIAGNOSIS — Z12.11 SCREENING FOR COLORECTAL CANCER: ICD-10-CM

## 2024-08-09 DIAGNOSIS — Z13.228 SCREENING FOR ENDOCRINE, NUTRITIONAL, METABOLIC AND IMMUNITY DISORDER: ICD-10-CM

## 2024-08-09 DIAGNOSIS — Z13.21 SCREENING FOR ENDOCRINE, NUTRITIONAL, METABOLIC AND IMMUNITY DISORDER: ICD-10-CM

## 2024-08-09 DIAGNOSIS — Z86.69 HISTORY OF RETINAL DETACHMENT: ICD-10-CM

## 2024-08-09 DIAGNOSIS — Z13.29 SCREENING FOR ENDOCRINE, NUTRITIONAL, METABOLIC AND IMMUNITY DISORDER: ICD-10-CM

## 2024-08-09 DIAGNOSIS — M25.512 CHRONIC LEFT SHOULDER PAIN: ICD-10-CM

## 2024-08-09 DIAGNOSIS — H91.8X2 OTHER SPECIFIED HEARING LOSS OF LEFT EAR, UNSPECIFIED HEARING STATUS ON CONTRALATERAL SIDE: ICD-10-CM

## 2024-08-09 DIAGNOSIS — G89.29 CHRONIC LEFT SHOULDER PAIN: ICD-10-CM

## 2024-08-09 DIAGNOSIS — Z23 NEED FOR VACCINATION: ICD-10-CM

## 2024-08-09 DIAGNOSIS — K27.9 H PYLORI ULCER: ICD-10-CM

## 2024-08-09 DIAGNOSIS — Z12.12 SCREENING FOR COLORECTAL CANCER: ICD-10-CM

## 2024-08-09 DIAGNOSIS — Z13.0 SCREENING FOR ENDOCRINE, NUTRITIONAL, METABOLIC AND IMMUNITY DISORDER: ICD-10-CM

## 2024-08-09 PROCEDURE — RXMED WILLOW AMBULATORY MEDICATION CHARGE

## 2024-08-09 PROCEDURE — 90471 IMMUNIZATION ADMIN: CPT

## 2024-08-09 PROCEDURE — 99204 OFFICE O/P NEW MOD 45 MIN: CPT

## 2024-08-09 PROCEDURE — 99213 OFFICE O/P EST LOW 20 MIN: CPT | Mod: 25

## 2024-08-09 RX ORDER — CLARITHROMYCIN 500 MG
500 TABLET ORAL 2 TIMES DAILY
Qty: 28 TABLET | Refills: 0 | Status: SHIPPED | OUTPATIENT
Start: 2024-08-09 | End: 2024-08-25

## 2024-08-09 RX ORDER — METRONIDAZOLE 250 MG/1
250 TABLET ORAL 4 TIMES DAILY
Qty: 56 TABLET | Refills: 0 | Status: SHIPPED | OUTPATIENT
Start: 2024-08-09 | End: 2024-08-23

## 2024-08-09 RX ORDER — TETRACYCLINE HYDROCHLORIDE 500 MG/1
500 CAPSULE ORAL 4 TIMES DAILY
Qty: 56 CAPSULE | Refills: 0 | Status: SHIPPED | OUTPATIENT
Start: 2024-08-09 | End: 2024-08-09

## 2024-08-09 ASSESSMENT — FIBROSIS 4 INDEX: FIB4 SCORE: 1.1

## 2024-08-09 NOTE — ASSESSMENT & PLAN NOTE
Chronic, ongoing.  X-ray in the hospital with possible rotator cuff pathology.  Will have him follow-up with orthopedics for possible MRI and treatment.

## 2024-08-09 NOTE — ASSESSMENT & PLAN NOTE
Patient was recently hospitalized for acute abdominal pain, underwent an endoscopy and was found to be H. pylori positive.  Unfortunately he was not followed up with him regarding these results thus treatment was not started he reports ongoing abdominal pain denies any melena.  He has been working on dietary modifications to help with the pain but nothing has been alleviating it he is no longer taking the pantoprazole.

## 2024-08-09 NOTE — PROGRESS NOTES
Subjective:     CC:  Diagnoses of H pylori ulcer, Chronic left shoulder pain, Other specified hearing loss of left ear, unspecified hearing status on contralateral side, History of retinal detachment, Screening for colorectal cancer, Need for vaccination, Screening for endocrine, nutritional, metabolic and immunity disorder, and Acute pain of right shoulder were pertinent to this visit.    HISTORY OF THE PRESENT ILLNESS: Patient is a 58 y.o. male. This pleasant patient is here today to establish care.    H pylori ulcer  Patient was recently hospitalized for acute abdominal pain, underwent an endoscopy and was found to be H. pylori positive.  Unfortunately he was not followed up with him regarding these results thus treatment was not started he reports ongoing abdominal pain denies any melena.  He has been working on dietary modifications to help with the pain but nothing has been alleviating it he is no longer taking the pantoprazole.      Acute pain of right shoulder  Chronic, ongoing.  X-ray in the hospital with possible rotator cuff pathology.  Will have him follow-up with orthopedics for possible MRI and treatment.      Health Maintenance: reviewed and completed per patient preference.     ROS:   - CONSTITUTIONAL: Denies weight loss, fever and chills.  - HEENT: Endorses changes in vision and hearing.  - RESPIRATORY: Denies SOB and cough.  - CV: Denies palpitations and CP.  - GI: Endorses abdominal pain and nausea  - : Denies dysuria and urinary frequency.  - MSK: Denies myalgia and joint pain.  - SKIN: Denies rash and pruritus.  - NEUROLOGICAL: Denies headache and syncope.  - PSYCHIATRIC: Denies recent changes in mood. Denies anxiety and depression.           Social History     Socioeconomic History    Marital status: Single     Spouse name: Not on file    Number of children: Not on file    Years of education: Not on file    Highest education level: 12th grade   Occupational History    Not on file   Tobacco  Use    Smoking status: Some Days     Current packs/day: 0.25     Average packs/day: 0.3 packs/day for 10.0 years (2.5 ttl pk-yrs)     Types: Cigarettes    Smokeless tobacco: Current     Types: Chew    Tobacco comments:     Chews 3 days/week   Vaping Use    Vaping status: Never Used   Substance and Sexual Activity    Alcohol use: Not Currently    Drug use: No    Sexual activity: Not on file   Other Topics Concern    Not on file   Social History Narrative    Not on file     Social Determinants of Health     Financial Resource Strain: Low Risk  (5/1/2024)    Overall Financial Resource Strain (CARDIA)     Difficulty of Paying Living Expenses: Not very hard   Food Insecurity: Food Insecurity Present (5/1/2024)    Hunger Vital Sign     Worried About Running Out of Food in the Last Year: Sometimes true     Ran Out of Food in the Last Year: Patient declined   Transportation Needs: No Transportation Needs (5/1/2024)    PRAPARE - Transportation     Lack of Transportation (Medical): No     Lack of Transportation (Non-Medical): No   Physical Activity: Inactive (5/1/2024)    Exercise Vital Sign     Days of Exercise per Week: 6 days     Minutes of Exercise per Session: 0 min   Stress: Not on file   Social Connections: Socially Isolated (5/1/2024)    Social Connection and Isolation Panel [NHANES]     Frequency of Communication with Friends and Family: Once a week     Frequency of Social Gatherings with Friends and Family: Once a week     Attends Yarsani Services: Never     Active Member of Clubs or Organizations: No     Attends Club or Organization Meetings: Never     Marital Status: Living with partner   Intimate Partner Violence: Not on file   Housing Stability: Low Risk  (5/1/2024)    Housing Stability Vital Sign     Unable to Pay for Housing in the Last Year: No     Number of Places Lived in the Last Year: 1     Unstable Housing in the Last Year: No      No Known Allergies         Current Outpatient Medications:      "metroNIDAZOLE, 250 mg, Oral, 4X/DAY    tetracycline, 500 mg, Oral, 4XDAY    Bismuth Subsalicylate, 2 Tablet, Oral, 4X/DAY    omeprazole, 20 mg, Oral, BID   Objective:     Exam: /70 (BP Location: Right arm, Patient Position: Sitting, BP Cuff Size: Adult)   Pulse (!) 59   Temp (!) 35.7 °C (96.3 °F) (Temporal)   Resp 16   Ht 1.702 m (5' 7\")   Wt 78.7 kg (173 lb 8 oz)   SpO2 95%  Body mass index is 27.17 kg/m².    Physical Exam:  Constitutional: Alert, no distress, well-groomed.  Skin: Warm, dry, good turgor, no rashes in visible areas.  Eye: Equal, round and reactive, conjunctiva clear, lids normal.  Ear: Left tympanic membrane perforated.  ENMT: Lips without lesions, good dentition, moist mucous membranes.  Neck: Trachea midline, no masses, no thyromegaly.  Respiratory: Unlabored respiratory effort, no cough.  Abd: soft, non tender, non distended, normal BS  MSK: Normal gait, moves all extremities.  Neuro: Grossly non-focal.   Psych: Alert and oriented x3, normal affect and mood.    Labs: Reviewed    Assessment & Plan:   58 y.o. male with the following -    1. H pylori ulcer  Acute issue. The patient presents with symptoms consistent with Helicobacter pylori infection, including epigastric pain, nausea, and occasional vomiting. The diagnosis is supported by positive results from diagnostic testing, which may include stool antigen tests, urea breath tests, or endoscopic biopsy with histology. We will treat with triple therapy. Discussed the importance of adherence to the medication.  Medications may be taken with food to reduce GI symptoms.  Following treatment we will confirm eradication with repeat testing in 4 weeks.  - metroNIDAZOLE (FLAGYL) 250 MG Tab; Take 1 Tablet by mouth 4 times a day for 14 days.  Dispense: 56 Tablet; Refill: 0  - omeprazole (PRILOSEC) 20 MG delayed-release capsule; Take 1 Capsule by mouth 2 times a day for 14 days.  Dispense: 28 Capsule; Refill: 0  - clarithromycin (BIAXIN) 500 " MG Tab; Take 1 Tablet by mouth 2 times a day for 14 days.  Dispense: 28 Tablet; Refill: 0    2. Chronic left shoulder pain  Acute on chronic issue.  Patient reports left shoulder weakness and pain.  X-ray demonstrated a possible rotator cuff pathology.  Will have him follow-up with orthopedics.  - Referral to Orthopedics    3. Other specified hearing loss of left ear, unspecified hearing status on contralateral side  - Referral to Audiology    4. History of retinal detachment  - Referral to Ophthalmology    5. Screening for colorectal cancer  The indications for colon cancer screening were discussed based on his age were discussed.  As he is at risk the options for colon cancer screening include colonoscopy, annual FIT testing and sigmoidoscopy.  The risks and benefits of the different options were discussed. He elects to proceed with FIT which was ordered.  - OCCULT BLOOD FECES IMMUNOASSAY (FIT); Future    6. Need for vaccination  Patient requests vaccination today. Vaccine given. Patient tolerated well. Follow up precautions given.   - Tdap Vaccine =>8YO IM    7. Screening for endocrine, nutritional, metabolic and immunity disorder  - HIV AG/AB COMBO ASSAY SCREENING; Future  - HEP C VIRUS ANTIBODY; Future  - Comp Metabolic Panel; Future  - CBC WITHOUT DIFFERENTIAL; Future  - HEMOGLOBIN A1C; Future  - Lipid Profile; Future  - PROSTATE SPECIFIC AG DIAGNOSTIC; Future      Return in about 4 weeks (around 9/6/2024).    Please note that this dictation was created using voice recognition software. I have made every reasonable attempt to correct obvious errors, but I expect that there are errors of grammar and possibly content that I did not discover before finalizing the note.       Uncooperative Uncooperative Uncooperative

## 2024-08-20 DIAGNOSIS — R10.9 ACUTE ABDOMINAL PAIN: ICD-10-CM

## 2024-08-20 DIAGNOSIS — K25.3 ACUTE GASTRIC ULCER WITHOUT HEMORRHAGE OR PERFORATION: ICD-10-CM

## 2024-09-17 ENCOUNTER — OFFICE VISIT (OUTPATIENT)
Dept: MEDICAL GROUP | Facility: MEDICAL CENTER | Age: 59
End: 2024-09-17
Payer: MEDICAID

## 2024-09-17 ENCOUNTER — PHARMACY VISIT (OUTPATIENT)
Dept: PHARMACY | Facility: MEDICAL CENTER | Age: 59
End: 2024-09-17
Payer: COMMERCIAL

## 2024-09-17 VITALS
WEIGHT: 168.4 LBS | HEART RATE: 77 BPM | DIASTOLIC BLOOD PRESSURE: 70 MMHG | RESPIRATION RATE: 16 BRPM | OXYGEN SATURATION: 98 % | TEMPERATURE: 97.4 F | SYSTOLIC BLOOD PRESSURE: 120 MMHG | BODY MASS INDEX: 26.38 KG/M2

## 2024-09-17 DIAGNOSIS — B96.81 H PYLORI ULCER: ICD-10-CM
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
## 2024-09-17 DIAGNOSIS — K27.9 H PYLORI ULCER: ICD-10-CM

## 2024-09-17 DIAGNOSIS — S41.151A DOG BITE OF RIGHT UPPER EXTREMITY, INITIAL ENCOUNTER: ICD-10-CM

## 2024-09-17 PROCEDURE — 99214 OFFICE O/P EST MOD 30 MIN: CPT

## 2024-09-17 PROCEDURE — 96372 THER/PROPH/DIAG INJ SC/IM: CPT

## 2024-09-17 PROCEDURE — RXMED WILLOW AMBULATORY MEDICATION CHARGE

## 2024-09-17 PROCEDURE — 3074F SYST BP LT 130 MM HG: CPT

## 2024-09-17 PROCEDURE — 3078F DIAST BP <80 MM HG: CPT

## 2024-09-17 PROCEDURE — 700111 HCHG RX REV CODE 636 W/ 250 OVERRIDE (IP): Mod: JZ,UD

## 2024-09-17 RX ORDER — HYDROCODONE BITARTRATE AND ACETAMINOPHEN 5; 325 MG/1; MG/1
1 TABLET ORAL EVERY 8 HOURS PRN
Qty: 9 TABLET | Refills: 0 | Status: SHIPPED | OUTPATIENT
Start: 2024-09-17 | End: 2024-09-17

## 2024-09-17 RX ORDER — KETOROLAC TROMETHAMINE 30 MG/ML
30 INJECTION, SOLUTION INTRAMUSCULAR; INTRAVENOUS ONCE
Status: COMPLETED | OUTPATIENT
Start: 2024-09-17 | End: 2024-09-17

## 2024-09-17 RX ORDER — HYDROCODONE BITARTRATE AND ACETAMINOPHEN 5; 325 MG/1; MG/1
1 TABLET ORAL EVERY 8 HOURS PRN
Qty: 9 TABLET | Refills: 0 | Status: SHIPPED | OUTPATIENT
Start: 2024-09-17 | End: 2024-09-20

## 2024-09-17 RX ADMIN — KETOROLAC TROMETHAMINE 30 MG: 30 INJECTION, SOLUTION INTRAMUSCULAR; INTRAVENOUS at 10:17

## 2024-09-17 ASSESSMENT — FIBROSIS 4 INDEX: FIB4 SCORE: 1.1

## 2024-09-17 NOTE — PROGRESS NOTES
Verbal consent was acquired by the patient to use [employer] ambient listening note generation during this visit     Subjective:     CC:  Diagnoses of Dog bite of right upper extremity, initial encounter and H pylori ulcer were pertinent to this visit.    HISTORY OF THE PRESENT ILLNESS: Patient is a 58 y.o. male.     History of Present Illness  The patient presents for evaluation of a dog bite.    He sustained a dog bite on his right hand at 4:00 AM today while attempting to separate his dog from another during a walk. He reports a throbbing sensation in the bitten area, which is painful to touch and twist. Despite the pain, he can still make a fist and squeeze his hand. He managed to stop the bleeding and wrapped the wound with a cloth. He is not experiencing any numbness or tingling in his hand. Being right-handed, he feels that his strength on that side is satisfactory. He does not wish to have stitches. He has no allergies to Betadine or iodine. He is not feeling lightheaded or dizzy. He confirms that the dog responsible for the bite has received all necessary vaccinations. He has no history of drug use.    He finished his medications last week and has been feeling a lot better. He has not had any stomach problems.    He went to the shoulder doctor and was informed that his shoulder has been problematic for so long that even with surgery, he might not regain full movement. He is not in pain but can manage the discomfort. He has been scheduled for hormone or steroid shots in his shoulder to help alleviate the pain. He cannot lift his arm straight out in front of him.    He has an appointment with the eye doctor for an eye exam on 10/31/2024.    IMMUNIZATIONS  His last Tdap was about a month ago.    Health Maintenance: reviewed and completed per patient preference.     ROS:   - CONSTITUTIONAL: Denies weight loss, fever and chills.  - HEENT: Denies changes in vision and hearing.  - RESPIRATORY: Denies SOB and  cough.  - CV: Denies palpitations and CP.  - GI: Denies abdominal pain, nausea, vomiting and diarrhea.  - : Denies dysuria and urinary frequency.  - MSK: Denies myalgia and joint pain.  - SKIN: Denies rash and pruritus. Dog bite on right arm   - NEUROLOGICAL: Denies headache and syncope.  - PSYCHIATRIC: Denies recent changes in mood. Denies anxiety and depression.           Social History     Socioeconomic History    Marital status: Single     Spouse name: Not on file    Number of children: Not on file    Years of education: Not on file    Highest education level: 12th grade   Occupational History    Not on file   Tobacco Use    Smoking status: Some Days     Current packs/day: 0.25     Average packs/day: 0.3 packs/day for 10.0 years (2.5 ttl pk-yrs)     Types: Cigarettes    Smokeless tobacco: Current     Types: Chew    Tobacco comments:     Chews 3 days/week   Vaping Use    Vaping status: Never Used   Substance and Sexual Activity    Alcohol use: Not Currently    Drug use: No    Sexual activity: Not on file   Other Topics Concern    Not on file   Social History Narrative    Not on file     Social Determinants of Health     Financial Resource Strain: Low Risk  (5/1/2024)    Overall Financial Resource Strain (CARDIA)     Difficulty of Paying Living Expenses: Not very hard   Food Insecurity: Food Insecurity Present (5/1/2024)    Hunger Vital Sign     Worried About Running Out of Food in the Last Year: Sometimes true     Ran Out of Food in the Last Year: Patient declined   Transportation Needs: No Transportation Needs (5/1/2024)    PRAPARE - Transportation     Lack of Transportation (Medical): No     Lack of Transportation (Non-Medical): No   Physical Activity: Inactive (5/1/2024)    Exercise Vital Sign     Days of Exercise per Week: 6 days     Minutes of Exercise per Session: 0 min   Stress: Not on file   Social Connections: Socially Isolated (5/1/2024)    Social Connection and Isolation Panel [NHANES]     Frequency  of Communication with Friends and Family: Once a week     Frequency of Social Gatherings with Friends and Family: Once a week     Attends Mu-ism Services: Never     Active Member of Clubs or Organizations: No     Attends Club or Organization Meetings: Never     Marital Status: Living with partner   Intimate Partner Violence: Not on file   Housing Stability: Low Risk  (5/1/2024)    Housing Stability Vital Sign     Unable to Pay for Housing in the Last Year: No     Number of Places Lived in the Last Year: 1     Unstable Housing in the Last Year: No      No Known Allergies         Current Outpatient Medications:     amoxicillin-clavulanate, 1 Tablet, Oral, BID    HYDROcodone-acetaminophen, 1 Tablet, Oral, Q8HRS PRN   Objective:     Exam: /70 (BP Location: Right arm, Patient Position: Sitting, BP Cuff Size: Adult)   Pulse 77   Temp 36.3 °C (97.4 °F) (Temporal)   Resp 16   Wt 76.4 kg (168 lb 6.4 oz)   SpO2 98%  Body mass index is 26.38 kg/m².    Physical Exam:  Constitutional: Alert, no distress, well-groomed.  Skin: Multiple puncture wounds to the right forearm with a 2 inch long by 2 mm deep laceration along the wrist line.  MSK: Normal gait, moves all extremities.    Right upper extremity: CMS intact.      Labs: Reviewed    Assessment & Plan:   58 y.o. male with the following -    1. Dog bite of right upper extremity, initial encounter  Acute issue, ongoing. The right hand exhibits good strength and pulses, with no signs of numbness or tingling. The wound appears superficial and does not necessitate sutures. No foreign material was detected in the wound. His neurovascular system remains intact. His Tdap vaccination is current, having been administered on 08/09/2024. The wound was thoroughly cleaned, Betadine applied and treated with triple antibiotic ointment. A prescription for Augmentin 875 mg twice daily for 5 days was issued. A Toradol injection was administered for pain management. A prescription  for Norco 5 mg was given for 3 days. He was advised to elevate and apply ice to the affected area. A work note was provided. Should the condition of his hand deteriorate, he is to seek immediate medical attention at the ER.  - amoxicillin-clavulanate (AUGMENTIN) 875-125 MG Tab; Take 1 Tablet by mouth 2 times a day.  Dispense: 10 Tablet; Refill: 0  - ketorolac (Toradol) injection 30 mg  - HYDROcodone-acetaminophen (NORCO) 5-325 MG Tab per tablet; Take 1 Tablet by mouth every 8 hours as needed (For severe pain) for up to 3 days.  Dispense: 9 Tablet; Refill: 0  - Controlled Substance Treatment Agreement    2. H pylori ulcer  Chronic, ongoing. He completed a 2-week treatment last week and reports significant improvement with no stomach problems. An H. pylori breath test was ordered to be conducted in 3 weeks to ensure the infection has cleared.  - H. PYLORI BREATH TEST        Return in about 2 days (around 9/19/2024), or Wound check.    Please note that this dictation was created using voice recognition software. I have made every reasonable attempt to correct obvious errors, but I expect that there are errors of grammar and possibly content that I did not discover before finalizing the note.

## 2024-09-17 NOTE — LETTER
September 17, 2024    To Whom It May Concern:         This is confirmation that Iron Chaudhari attended his scheduled appointment with BRYANT Staples on 9/17/24. Please excuse his from work until Monday 10/23/24.         If you have any questions please do not hesitate to call me at the phone number listed below.    Sincerely,          [employer].  [phone]

## 2024-10-30 ENCOUNTER — OFFICE VISIT (OUTPATIENT)
Dept: MEDICAL GROUP | Facility: MEDICAL CENTER | Age: 59
End: 2024-10-30
Payer: MEDICAID

## 2024-10-30 VITALS
SYSTOLIC BLOOD PRESSURE: 150 MMHG | DIASTOLIC BLOOD PRESSURE: 102 MMHG | OXYGEN SATURATION: 97 % | TEMPERATURE: 98 F | BODY MASS INDEX: 27.06 KG/M2 | WEIGHT: 172.8 LBS | RESPIRATION RATE: 16 BRPM | HEART RATE: 79 BPM

## 2024-10-30 DIAGNOSIS — M54.50 ACUTE LEFT-SIDED LOW BACK PAIN WITHOUT SCIATICA: ICD-10-CM

## 2024-10-30 DIAGNOSIS — B96.81 H PYLORI ULCER: ICD-10-CM

## 2024-10-30 DIAGNOSIS — R03.0 ELEVATED BLOOD PRESSURE READING WITHOUT DIAGNOSIS OF HYPERTENSION: ICD-10-CM

## 2024-10-30 DIAGNOSIS — K27.9 H PYLORI ULCER: ICD-10-CM

## 2024-10-30 DIAGNOSIS — S41.151D DOG BITE OF RIGHT UPPER EXTREMITY, SUBSEQUENT ENCOUNTER: ICD-10-CM
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
## 2024-10-30 PROCEDURE — 99213 OFFICE O/P EST LOW 20 MIN: CPT

## 2024-10-30 PROCEDURE — 96372 THER/PROPH/DIAG INJ SC/IM: CPT

## 2024-10-30 PROCEDURE — 700111 HCHG RX REV CODE 636 W/ 250 OVERRIDE (IP): Mod: JZ,UD

## 2024-10-30 RX ORDER — KETOROLAC TROMETHAMINE 30 MG/ML
30 INJECTION, SOLUTION INTRAMUSCULAR; INTRAVENOUS ONCE
Status: COMPLETED | OUTPATIENT
Start: 2024-10-30 | End: 2024-10-30

## 2024-10-30 RX ADMIN — KETOROLAC TROMETHAMINE 30 MG: 30 INJECTION, SOLUTION INTRAMUSCULAR; INTRAVENOUS at 08:32

## 2024-10-30 ASSESSMENT — FIBROSIS 4 INDEX: FIB4 SCORE: 1.11

## 2025-04-26 ENCOUNTER — HOSPITAL ENCOUNTER (EMERGENCY)
Facility: MEDICAL CENTER | Age: 60
End: 2025-04-26
Attending: EMERGENCY MEDICINE
Payer: MEDICAID

## 2025-04-26 ENCOUNTER — APPOINTMENT (OUTPATIENT)
Dept: RADIOLOGY | Facility: MEDICAL CENTER | Age: 60
End: 2025-04-26
Attending: EMERGENCY MEDICINE
Payer: MEDICAID

## 2025-04-26 ENCOUNTER — PHARMACY VISIT (OUTPATIENT)
Dept: PHARMACY | Facility: MEDICAL CENTER | Age: 60
End: 2025-04-26
Payer: COMMERCIAL

## 2025-04-26 VITALS
BODY MASS INDEX: 25.76 KG/M2 | OXYGEN SATURATION: 90 % | HEART RATE: 71 BPM | TEMPERATURE: 96.8 F | HEIGHT: 68 IN | WEIGHT: 170 LBS | SYSTOLIC BLOOD PRESSURE: 98 MMHG | RESPIRATION RATE: 16 BRPM | DIASTOLIC BLOOD PRESSURE: 56 MMHG

## 2025-04-26 DIAGNOSIS — R10.84 GENERALIZED ABDOMINAL PAIN: ICD-10-CM

## 2025-04-26 DIAGNOSIS — K59.00 CONSTIPATION, UNSPECIFIED CONSTIPATION TYPE: ICD-10-CM

## 2025-04-26 LAB
ALBUMIN SERPL BCP-MCNC: 3.8 G/DL (ref 3.2–4.9)
ALBUMIN/GLOB SERPL: 1.3 G/DL
ALP SERPL-CCNC: 94 U/L (ref 30–99)
ALT SERPL-CCNC: 23 U/L (ref 2–50)
ANION GAP SERPL CALC-SCNC: 12 MMOL/L (ref 7–16)
AST SERPL-CCNC: 25 U/L (ref 12–45)
BACTERIA BLD CULT: NORMAL
BACTERIA BLD CULT: NORMAL
BASOPHILS # BLD AUTO: 0.7 % (ref 0–1.8)
BASOPHILS # BLD: 0.08 K/UL (ref 0–0.12)
BILIRUB SERPL-MCNC: 0.6 MG/DL (ref 0.1–1.5)
BUN SERPL-MCNC: 19 MG/DL (ref 8–22)
CALCIUM ALBUM COR SERPL-MCNC: 9.6 MG/DL (ref 8.5–10.5)
CALCIUM SERPL-MCNC: 9.4 MG/DL (ref 8.5–10.5)
CHLORIDE SERPL-SCNC: 103 MMOL/L (ref 96–112)
CO2 SERPL-SCNC: 22 MMOL/L (ref 20–33)
CREAT SERPL-MCNC: 1.23 MG/DL (ref 0.5–1.4)
EOSINOPHIL # BLD AUTO: 0.15 K/UL (ref 0–0.51)
EOSINOPHIL NFR BLD: 1.4 % (ref 0–6.9)
ERYTHROCYTE [DISTWIDTH] IN BLOOD BY AUTOMATED COUNT: 41 FL (ref 35.9–50)
GFR SERPLBLD CREATININE-BSD FMLA CKD-EPI: 67 ML/MIN/1.73 M 2
GLOBULIN SER CALC-MCNC: 2.9 G/DL (ref 1.9–3.5)
GLUCOSE SERPL-MCNC: 204 MG/DL (ref 65–99)
HCT VFR BLD AUTO: 51.2 % (ref 42–52)
HGB BLD-MCNC: 17.3 G/DL (ref 14–18)
IMM GRANULOCYTES # BLD AUTO: 0.05 K/UL (ref 0–0.11)
IMM GRANULOCYTES NFR BLD AUTO: 0.5 % (ref 0–0.9)
LACTATE SERPL-SCNC: 2.7 MMOL/L (ref 0.5–2)
LIPASE SERPL-CCNC: 140 U/L (ref 11–82)
LYMPHOCYTES # BLD AUTO: 1.85 K/UL (ref 1–4.8)
LYMPHOCYTES NFR BLD: 17 % (ref 22–41)
MCH RBC QN AUTO: 28.7 PG (ref 27–33)
MCHC RBC AUTO-ENTMCNC: 33.8 G/DL (ref 32.3–36.5)
MCV RBC AUTO: 84.9 FL (ref 81.4–97.8)
MONOCYTES # BLD AUTO: 0.54 K/UL (ref 0–0.85)
MONOCYTES NFR BLD AUTO: 4.9 % (ref 0–13.4)
NEUTROPHILS # BLD AUTO: 8.24 K/UL (ref 1.82–7.42)
NEUTROPHILS NFR BLD: 75.5 % (ref 44–72)
NRBC # BLD AUTO: 0 K/UL
NRBC BLD-RTO: 0 /100 WBC (ref 0–0.2)
PLATELET # BLD AUTO: 313 K/UL (ref 164–446)
PMV BLD AUTO: 8.2 FL (ref 9–12.9)
POTASSIUM SERPL-SCNC: 4.5 MMOL/L (ref 3.6–5.5)
PROT SERPL-MCNC: 6.7 G/DL (ref 6–8.2)
RBC # BLD AUTO: 6.03 M/UL (ref 4.7–6.1)
SIGNIFICANT IND 70042: NORMAL
SIGNIFICANT IND 70042: NORMAL
SITE SITE: NORMAL
SITE SITE: NORMAL
SODIUM SERPL-SCNC: 137 MMOL/L (ref 135–145)
SOURCE SOURCE: NORMAL
SOURCE SOURCE: NORMAL
WBC # BLD AUTO: 10.9 K/UL (ref 4.8–10.8)

## 2025-04-26 PROCEDURE — 96374 THER/PROPH/DIAG INJ IV PUSH: CPT

## 2025-04-26 PROCEDURE — 80053 COMPREHEN METABOLIC PANEL: CPT

## 2025-04-26 PROCEDURE — 83605 ASSAY OF LACTIC ACID: CPT

## 2025-04-26 PROCEDURE — 87040 BLOOD CULTURE FOR BACTERIA: CPT

## 2025-04-26 PROCEDURE — 700111 HCHG RX REV CODE 636 W/ 250 OVERRIDE (IP): Mod: JZ,UD | Performed by: EMERGENCY MEDICINE

## 2025-04-26 PROCEDURE — 83690 ASSAY OF LIPASE: CPT

## 2025-04-26 PROCEDURE — RXMED WILLOW AMBULATORY MEDICATION CHARGE: Performed by: EMERGENCY MEDICINE

## 2025-04-26 PROCEDURE — 96375 TX/PRO/DX INJ NEW DRUG ADDON: CPT

## 2025-04-26 PROCEDURE — 71045 X-RAY EXAM CHEST 1 VIEW: CPT

## 2025-04-26 PROCEDURE — A9270 NON-COVERED ITEM OR SERVICE: HCPCS | Mod: UD | Performed by: EMERGENCY MEDICINE

## 2025-04-26 PROCEDURE — 85025 COMPLETE CBC W/AUTO DIFF WBC: CPT

## 2025-04-26 PROCEDURE — 99285 EMERGENCY DEPT VISIT HI MDM: CPT

## 2025-04-26 PROCEDURE — 74018 RADEX ABDOMEN 1 VIEW: CPT

## 2025-04-26 PROCEDURE — 700102 HCHG RX REV CODE 250 W/ 637 OVERRIDE(OP): Mod: UD | Performed by: EMERGENCY MEDICINE

## 2025-04-26 PROCEDURE — 36415 COLL VENOUS BLD VENIPUNCTURE: CPT

## 2025-04-26 RX ORDER — SUCRALFATE 1 G/1
1 TABLET ORAL
Qty: 120 TABLET | Refills: 3 | Status: SHIPPED | OUTPATIENT
Start: 2025-04-26 | End: 2025-04-26

## 2025-04-26 RX ORDER — KETOROLAC TROMETHAMINE 15 MG/ML
15 INJECTION, SOLUTION INTRAMUSCULAR; INTRAVENOUS ONCE
Status: COMPLETED | OUTPATIENT
Start: 2025-04-26 | End: 2025-04-26

## 2025-04-26 RX ORDER — SUCRALFATE 1 G/1
1 TABLET ORAL
Qty: 120 TABLET | Refills: 3 | Status: SHIPPED | OUTPATIENT
Start: 2025-04-26 | End: 2025-05-12

## 2025-04-26 RX ORDER — POLYETHYLENE GLYCOL 3350 17 G/17G
17 POWDER, FOR SOLUTION ORAL DAILY
Qty: 5 PACKET | Refills: 0 | Status: SHIPPED | OUTPATIENT
Start: 2025-04-26 | End: 2025-04-26

## 2025-04-26 RX ORDER — FAMOTIDINE 20 MG/1
20 TABLET, FILM COATED ORAL 2 TIMES DAILY
Qty: 60 TABLET | Refills: 0 | Status: SHIPPED | OUTPATIENT
Start: 2025-04-26 | End: 2025-05-12

## 2025-04-26 RX ORDER — FAMOTIDINE 20 MG/1
20 TABLET, FILM COATED ORAL 2 TIMES DAILY
Qty: 60 TABLET | Refills: 0 | Status: SHIPPED | OUTPATIENT
Start: 2025-04-26 | End: 2025-04-26

## 2025-04-26 RX ORDER — POLYETHYLENE GLYCOL 3350 17 G/17G
17 POWDER, FOR SOLUTION ORAL DAILY
Qty: 5 PACKET | Refills: 0 | Status: SHIPPED | OUTPATIENT
Start: 2025-04-26 | End: 2025-05-03

## 2025-04-26 RX ORDER — DICYCLOMINE HYDROCHLORIDE 10 MG/1
10 CAPSULE ORAL
Qty: 120 CAPSULE | Refills: 0 | Status: SHIPPED | OUTPATIENT
Start: 2025-04-26

## 2025-04-26 RX ORDER — SUCRALFATE ORAL 1 G/10ML
1 SUSPENSION ORAL EVERY 6 HOURS
Status: COMPLETED | OUTPATIENT
Start: 2025-04-26 | End: 2025-04-26

## 2025-04-26 RX ORDER — DICYCLOMINE HCL 20 MG
20 TABLET ORAL ONCE
Status: COMPLETED | OUTPATIENT
Start: 2025-04-26 | End: 2025-04-26

## 2025-04-26 RX ORDER — DICYCLOMINE HYDROCHLORIDE 10 MG/1
10 CAPSULE ORAL
Qty: 120 CAPSULE | Refills: 0 | Status: SHIPPED | OUTPATIENT
Start: 2025-04-26 | End: 2025-04-26

## 2025-04-26 RX ADMIN — FAMOTIDINE 20 MG: 10 INJECTION, SOLUTION INTRAVENOUS at 18:04

## 2025-04-26 RX ADMIN — KETOROLAC TROMETHAMINE 15 MG: 15 INJECTION, SOLUTION INTRAMUSCULAR; INTRAVENOUS at 18:04

## 2025-04-26 RX ADMIN — SUCRALFATE ORAL SUSPENSION 1 G: 1 SUSPENSION ORAL at 18:04

## 2025-04-26 RX ADMIN — DICYCLOMINE HYDROCHLORIDE 20 MG: 20 TABLET ORAL at 18:03

## 2025-04-26 ASSESSMENT — PAIN DESCRIPTION - PAIN TYPE
TYPE: ACUTE PAIN
TYPE: ACUTE PAIN

## 2025-04-26 ASSESSMENT — FIBROSIS 4 INDEX: FIB4 SCORE: 1.11

## 2025-04-26 NOTE — ED NOTES
Chief Complaint   Patient presents with    Abdominal Pain     Ambulates to triage report lower abdominal pain x 3 days. Also reports constipation. Last bowel movement was 4 days. +nausea -vomiting.     Denies dysuria. Educated on triage process. Instructed to notify staff for any worsening symptoms. Denies any recent travel. Denies exposure to known covid positive patients. Denies any respiratory symptoms.

## 2025-04-26 NOTE — Clinical Note
Iron Chaudhari was seen and treated in our emergency department on 4/26/2025.  He may return to work on 04/27/2025.       If you have any questions or concerns, please don't hesitate to call.      Ricardo Sheppard M.D.

## 2025-04-27 NOTE — DISCHARGE PLANNING
Pt's SO Minda called [phone] requesting for medications to be changed to [facility] since [facility] is closed. She also requested a work note for pt. Pt consented to this. MD Sheppard changed medications to [facility]. Work note printed and given to OLMAN perez to give to SO. Minda and pt updated about this.

## 2025-04-27 NOTE — ED PROVIDER NOTES
ER Provider Note    Scribed for Ricardo Sheppard M.d. by Arabella Rock. 4/26/2025  5:47 PM    Primary Care Provider: BRYANT Staples    CHIEF COMPLAINT  Chief Complaint   Patient presents with    Abdominal Pain     Ambulates to triage report lower abdominal pain x 3 days. Also reports constipation. Last bowel movement was 4 days. +nausea -vomiting.     EXTERNAL RECORDS REVIEWED  No records to review since PCP visit last year.    HPI/ROS  LIMITATION TO HISTORY   Select: Very poor historian.    OUTSIDE HISTORIAN(S):  None    Iron Chaudhari is a 59 y.o. male who presents to the ED complaining of lower abdominal pain onset three days ago. The patient reports that he has also been experiencing nausea, vomiting, constipation for this few days. He notes the vomiting is more similar to dry heaving where nothing is able to come out. He adds concern for H. Pylori as he was diagnosed with this before and given a course of antibiotics. He denies abdominal surgical history. He reports taking a few left over antibiotics he had, and also CBD based pain medication at home. There are no known alleviating or exacerbating factors.      PAST MEDICAL HISTORY  History reviewed. No pertinent past medical history.    SURGICAL HISTORY  Past Surgical History:   Procedure Laterality Date    UT UPPER GI ENDOSCOPY,DIAGNOSIS N/A 4/3/2024    Procedure: GASTROSCOPY;  Surgeon: Joanne Garcia M.D.;  Location: SURGERY SAME DAY [facility];  Service: Gastroenterology    UT UPPER GI ENDOSCOPY,BIOPSY N/A 4/3/2024    Procedure: GASTROSCOPY, WITH BIOPSY;  Surgeon: Joanne Garcia M.D.;  Location: SURGERY SAME DAY [facility];  Service: Gastroenterology    GASTROSCOPY WITH ENDOSTAT N/A 4/3/2024    Procedure: EGD, WITH CAUTERIZATION;  Surgeon: Joanne Garcia M.D.;  Location: SURGERY SAME DAY [facility];  Service: Gastroenterology    RESTORATE HEMOSTAS N/A 4/3/2024    Procedure: CONTROL OF HEMORRHAGE;  Surgeon: Joanne Garcia M.D.;   "Location: SURGERY SAME DAY [facility];  Service: Gastroenterology    EAR MIDDLE EXPLORATION         FAMILY HISTORY  History reviewed. No pertinent family history.    SOCIAL HISTORY   reports that he has been smoking cigarettes. He has a 2.5 pack-year smoking history. His smokeless tobacco use includes chew. He reports that he does not currently use alcohol. He reports that he does not use drugs.    CURRENT MEDICATIONS  None noted     ALLERGIES  Patient has no known allergies.    PHYSICAL EXAM  VITAL SIGNS: BP 97/63   Pulse 95   Temp 36 °C (96.8 °F) (Oral)   Resp 16   Ht 1.727 m (5' 8\")   Wt 77.1 kg (170 lb)   SpO2 93%   BMI 25.85 kg/m²   Pulse ox interpretation: I interpret this pulse ox as normal.  Constitutional: Alert in no apparent distress.  HENT: No signs of trauma, Bilateral external ears normal, Nose normal.   Eyes: Conjunctiva normal, Non-icteric.   Neck: Normal range of motion, Supple, No stridor.   Lymphatic: No lymphadenopathy noted.   Cardiovascular: Regular rate and rhythm, no murmurs.   Thorax & Lungs: Normal breath sounds, No respiratory distress, No wheezing  Abdomen: Bowel sounds normal, Soft, Mild diffuse abdominal tenderness that localizes only to the epigastric area, No masses, No pulsatile masses. No peritoneal signs.  Skin: Red/flushed skin diffusely usually associated with chronic heavy alcohol use. Warm, Dry, No erythema, No rash.   Back: No midline bony tenderness.   Extremities: Intact distal pulses, No edema, No cyanosis.  Musculoskeletal: Good range of motion in all major joints. No or major deformities noted.   Neurologic: Alert , Normal motor function, Normal sensory function, No focal deficits noted.   Psychiatric: Affect normal, Judgment normal, Mood normal.     DIAGNOSTIC STUDIES    Labs:   Labs Reviewed   CBC WITH DIFFERENTIAL - Abnormal; Notable for the following components:       Result Value    WBC 10.9 (*)     MPV 8.2 (*)     Neutrophils-Polys 75.50 (*)     Lymphocytes " 17.00 (*)     Neutrophils (Absolute) 8.24 (*)     All other components within normal limits   COMP METABOLIC PANEL - Abnormal; Notable for the following components:    Glucose 204 (*)     All other components within normal limits   LIPASE - Abnormal; Notable for the following components:    Lipase 140 (*)     All other components within normal limits   LACTIC ACID - Abnormal; Notable for the following components:    Lactic Acid 2.7 (*)     All other components within normal limits   ESTIMATED GFR   BLOOD CULTURE   BLOOD CULTURE   URINALYSIS   URINE CULTURE(NEW)     Radiology:   The attending emergency physician has independently interpreted the diagnostic imaging associated with this visit and am waiting the final reading from the radiologist.     Preliminary interpretation is a follows: No acute abnormalities     Radiologist interpretation:   [id number] VIEW   Final Result      1.  No bowel obstruction.   2.  Mildly increased colonic stool.      DX-CHEST-PORTABLE (1 VIEW)   Final Result      No acute cardiac or pulmonary abnormalities are identified.          COURSE & MEDICAL DECISION MAKING     INITIAL ASSESSMENT, COURSE AND PLAN  Care Narrative:     5:47 PM Patient presents to the ED with abdominal pain, nausea, vomiting, constipation.  Patient evaluated at bedside and discussed plan of care, including labs and imaging to further evaluate. Patient will be treated with Pepcid 20 mg, Toradol 15 mg, Carafate 1 g, and Bentyl 20 mg. Ordered for UA, lipase, CMP, CBC w/ diff, Blood culture, urine culture, lactic acid, DX-abdomen, DX-chest to evaluate his symptoms. Differential diagnoses include but not limited to:  H. Pylori or other gastritis, likely a component of alcohol gastritis based on patient skin tone, less likely diverticulitis or colitis.     7:22 PM - The patient informs me their symptoms feels improved following medication administration. Very slight lipase increase. Lactidc acid elevation not related to  infection after exam and labs, rather this is likely related to poor PO intake and GI fluid loss. No GI fluid loss in ED. Noted the patient's exam and vitals at this time are reassuring. Will treat symptomatically and have the patient return if failing to improve after aggressively addressing gastritis symptoms (he is cautioned against alcohol) and reported/xray proven constipation. Discussed plan for discharge; I advised the patient to return to the [facility] ED with any new or worsening symptoms. Patient was given the opportunity to ask any questions. I addressed all questions or concerns and the patient is stable for discharge at this time. Patient verbalizes understanding and agreement to this plan of care.             DISPOSITION AND DISCUSSIONS  I have discussed management of the patient with the following physicians and JOSIAH's:  None    Discussion of management with other QHP or appropriate source(s): None     Escalation of care considered, and ultimately not performed: acute inpatient care management, however at this time, the patient is most appropriate for outpatient management.    Barriers to care at this time, including but not limited to: None.     Decision tools and prescription drugs considered including, but not limited to: Pain Medications Tylenol and Motrin .    The patient will return for new or worsening symptoms and is stable at the time of discharge.    The patient is referred to a primary physician for blood pressure management, diabetic screening, and for all other preventative health concerns.    DISPOSITION:  Patient will be discharged home in stable condition.    FOLLOW UP:  Carito Mike A.P.R.N.  [address] [facility] [address]  [phone]    Schedule an appointment as soon as possible for a visit         OUTPATIENT MEDICATIONS:  Discharge Medication List as of 4/26/2025  6:59 PM        START taking these medications    Details   dicyclomine (BENTYL) 10 MG Cap Take 1 Capsule by mouth 4  Times a Day,Before Meals and at Bedtime., Disp-120 Capsule, R-0, Normal      famotidine (PEPCID) 20 MG Tab Take 1 Tablet by mouth 2 times a day., Disp-60 Tablet, R-0, Normal      sucralfate (CARAFATE) 1 GM Tab Take 1 Tablet by mouth 4 Times a Day,Before Meals and at Bedtime., Disp-120 Tablet, R-3, Normal      magnesium citrate Solution Take 300 mL by mouth one time for 1 dose., Disp-300 mL, R-0, Normal      polyethylene glycol/lytes (MIRALAX) 17 g Pack Take 1 Packet by mouth every day for 5 days., Disp-5 Packet, R-0, Normal              FINAL DIAGNOSIS  1. Generalized abdominal pain    2. Constipation, unspecified constipation type          I, Arabella Rock (Shirin), am scribing for, and in the presence of, Ricardo Sheppard M.D..    Electronically signed by: Arabella Rock (Shirin), 4/26/2025    IRicardo M.D. personally performed the services described in this documentation, as scribed by Arabella Rock in my presence, and it is both accurate and complete.

## 2025-04-27 NOTE — ED NOTES
Reviewed discharge information with pt. Pt verbalized understanding of all information given. PIV removed, pt discharged with all belongings, pt ambulatory with a steady gait.

## 2025-04-27 NOTE — DISCHARGE INSTRUCTIONS
Your tests here were reassuring.  There is constipation, though.    For acid/ulcer-like symptoms, use the Bentyl and Carafate and Pepcid prescribed.    To relieve constipation, take the magnesium citrate once, and then the 5 days of MiraLAX.  Schedule follow-up with your primary care provider.  Return here for severe or worsening symptoms.

## 2025-04-27 NOTE — ED NOTES
Med rec is complete per patient at bedside  Patient reports no home medications.  Outpatient antibiotics within the last 30 days: States he took two augmentin pills that were left over from prior course.   Anticoagulants: NONE  Patient's home pharmacy - None  Patient's fill history in EPIC: NO    She Silva PhT

## 2025-04-28 ENCOUNTER — PHARMACY VISIT (OUTPATIENT)
Dept: PHARMACY | Facility: MEDICAL CENTER | Age: 60
End: 2025-04-28

## 2025-05-01 LAB
BACTERIA BLD CULT: NORMAL
BACTERIA BLD CULT: NORMAL
SIGNIFICANT IND 70042: NORMAL
SIGNIFICANT IND 70042: NORMAL
SITE SITE: NORMAL
SITE SITE: NORMAL
SOURCE SOURCE: NORMAL
SOURCE SOURCE: NORMAL

## 2025-05-07 ENCOUNTER — OFFICE VISIT (OUTPATIENT)
Dept: MEDICAL GROUP | Facility: MEDICAL CENTER | Age: 60
End: 2025-05-07
Payer: MEDICAID

## 2025-05-07 VITALS
OXYGEN SATURATION: 96 % | HEART RATE: 86 BPM | TEMPERATURE: 98.6 F | WEIGHT: 171.4 LBS | SYSTOLIC BLOOD PRESSURE: 140 MMHG | BODY MASS INDEX: 26.06 KG/M2 | DIASTOLIC BLOOD PRESSURE: 88 MMHG

## 2025-05-07 DIAGNOSIS — F10.90 ALCOHOL USE: ICD-10-CM

## 2025-05-07 DIAGNOSIS — R10.9 ACUTE ABDOMINAL PAIN: ICD-10-CM

## 2025-05-07 DIAGNOSIS — K59.00 CONSTIPATION, UNSPECIFIED CONSTIPATION TYPE: ICD-10-CM

## 2025-05-07 PROCEDURE — 3077F SYST BP >= 140 MM HG: CPT

## 2025-05-07 PROCEDURE — 99214 OFFICE O/P EST MOD 30 MIN: CPT

## 2025-05-07 PROCEDURE — 99213 OFFICE O/P EST LOW 20 MIN: CPT

## 2025-05-07 PROCEDURE — 3079F DIAST BP 80-89 MM HG: CPT

## 2025-05-07 RX ORDER — POLYETHYLENE GLYCOL 3350 17 G/17G
17 POWDER, FOR SOLUTION ORAL DAILY
Qty: 850 G | Refills: 3 | Status: SHIPPED | OUTPATIENT
Start: 2025-05-07

## 2025-05-07 ASSESSMENT — PATIENT HEALTH QUESTIONNAIRE - PHQ9: CLINICAL INTERPRETATION OF PHQ2 SCORE: 0

## 2025-05-07 ASSESSMENT — FIBROSIS 4 INDEX: FIB4 SCORE: 1.02

## 2025-05-07 NOTE — PROGRESS NOTES
Verbal consent was acquired by the patient to use [employer] ambient listening note generation during this visit     Subjective:     CC:  Diagnoses of Acute abdominal pain, Constipation, unspecified constipation type, and Alcohol use were pertinent to this visit.    HISTORY OF THE PRESENT ILLNESS: Patient is a 59 y.o. male.     History of Present Illness  The patient presents for evaluation of abdominal pain, constipation, and back pain.    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  - Rates overall discomfort as 6 or 7 on a scale of 0 to 10  - Pain is intermittent with periods of relief followed by exacerbation  - Occasional numbness in fingers, not currently experiencing    Gastrointestinal Issues  - Constipation and decreased appetite led to an emergency room visit  - Prescribed antacids and a powder supplement for constipation, effective in restoring regular bowel movements  - Most recent bowel movement was this morning, characterized by soft, non-runny stools  - Continues to experience abdominal pain, which has worsened over time  - Pain severe enough to necessitate assuming a fetal position for comfort  - No fevers reported  - Inadequate water intake  - Consumed only cereal today  - NOT Tested for H. pylori in the ER  - Given medications in the ER that helped with abdominal pain  - Has run out of MiraLAX    Supplemental information: Heavy alcohol consumption, last intake being vodka 3 days ago, typically consuming a few shots 2 to 3 times a week when in pain. Does not believe symptoms are related to alcohol withdrawal and reports no history of such. No recent nausea or vomiting, although experienced dry heaves prior to ER visit. No headaches, vision changes, or auditory  hallucinations.    SOCIAL HISTORY  - Drinks alcohol: vodka 2 to 3 times a week, last drink 3 days ago    Health Maintenance: reviewed and completed per patient preference.     ROS:   PER HPI.           Social History     Socioeconomic History    Marital status: Single     Spouse name: Not on file    Number of children: Not on file    Years of education: Not on file    Highest education level: 12th grade   Occupational History    Not on file   Tobacco Use    Smoking status: Some Days     Current packs/day: 0.25     Average packs/day: 0.3 packs/day for 10.0 years (2.5 ttl pk-yrs)     Types: Cigarettes    Smokeless tobacco: Current     Types: Chew    Tobacco comments:     Chews 3 days/week   Vaping Use    Vaping status: Never Used   Substance and Sexual Activity    Alcohol use: Not Currently    Drug use: No    Sexual activity: Not on file   Other Topics Concern    Not on file   Social History Narrative    Not on file     Social Drivers of Health     Financial Resource Strain: Low Risk  (5/1/2024)    Overall Financial Resource Strain (CARDIA)     Difficulty of Paying Living Expenses: Not very hard   Food Insecurity: Food Insecurity Present (5/1/2024)    Hunger Vital Sign     Worried About Running Out of Food in the Last Year: Sometimes true     Ran Out of Food in the Last Year: Patient declined   Transportation Needs: No Transportation Needs (5/1/2024)    PRAPARE - Transportation     Lack of Transportation (Medical): No     Lack of Transportation (Non-Medical): No   Physical Activity: Inactive (5/1/2024)    Exercise Vital Sign     Days of Exercise per Week: 6 days     Minutes of Exercise per Session: 0 min   Stress: Not on file   Social Connections: Socially Isolated (5/1/2024)    Social Connection and Isolation Panel [NHANES]     Frequency of Communication with Friends and Family: Once a week     Frequency of Social Gatherings with Friends and Family: Once a week     Attends Orthodox Services: Never     Active Member  of Clubs or Organizations: No     Attends Club or Organization Meetings: Never     Marital Status: Living with partner   Intimate Partner Violence: Not on file   Housing Stability: Low Risk  (5/1/2024)    Housing Stability Vital Sign     Unable to Pay for Housing in the Last Year: No     Number of Places Lived in the Last Year: 1     Unstable Housing in the Last Year: No      No Known Allergies         Current Outpatient Medications:     polyethylene glycol 3350, 17 g, Oral, DAILY, Taking    famotidine, 20 mg, Oral, BID    dicyclomine, 10 mg, Oral, 4X/DAY ACHS    sucralfate, 1 g, Oral, 4X/DAY ACHS   Objective:     Exam: BP (!) 140/88 (BP Location: Right arm, Patient Position: Sitting, BP Cuff Size: Adult)   Pulse 86   Temp 37 °C (98.6 °F) (Temporal)   Wt 77.7 kg (171 lb 6.4 oz)   SpO2 96%  Body mass index is 26.06 kg/m².    Physical Exam:  Constitutional: Alert, no distress, well-groomed.  Skin: Warm, dry, good turgor, no rashes in visible areas.  Eye: Equal, round and reactive, conjunctiva clear, lids normal.  ENMT: Lips without lesions, good dentition, moist mucous membranes.  Neck: Trachea midline, no masses, no thyromegaly.  Respiratory: Unlabored respiratory effort, no cough.  Abd: soft, non tender, non distended, normal BS  MSK: Normal gait, moves all extremities.  Neuro: Grossly non-focal.   Psych: Alert and oriented x3, normal affect and mood.    Labs: Reviewed    Assessment & Plan:   59 y.o. male with the following -  Assessment & Plan  1. Abdominal pain: Acute.  - CT scan of the abdomen will be ordered to further investigate the cause of the pain.  - Complete the H. pylori breath test and the previously ordered lab tests.  - Maintain adequate hydration and limit alcohol consumption.  - Seek immediate medical attention at the hospital if condition deteriorates.    2. Constipation.  - MiraLAX seemed effective, and bowel movements have resumed.  - Take MiraLAX daily; if bowel movements become too soft,  reduce dosage to half a serving daily.  - Drink more water to prevent constipation.    3. Elevated blood glucose.  - Glucose levels were elevated during the last ER visit.  - A1c test will be conducted to rule out diabetes.  - Fast before the test and complete it the following morning.  - Maintain adequate hydration to prevent dehydration-related complications.    4. Alcohol use.  - Reports drinking vodka 2-3 times a week, with the last drink being 3 days ago.  - Reduce alcohol consumption as much as possible.  - No signs of alcohol withdrawal noted.  - Monitor for any strange sensations or symptoms related to alcohol use.    Follow-up  - Complete the H. pylori breath test and previously ordered lab tests.  - CT scan of the abdomen will be scheduled urgently.      1. Acute abdominal pain  CT-ABDOMEN-PELVIS W/O      2. Constipation, unspecified constipation type  polyethylene glycol 3350 (MIRALAX) 17 GM/SCOOP Powder      3. Alcohol use              Return if symptoms worsen or fail to improve.    Please note that this dictation was created using voice recognition software. I have made every reasonable attempt to correct obvious errors, but I expect that there are errors of grammar and possibly content that I did not discover before finalizing the note.

## 2025-05-08 ENCOUNTER — RESULTS FOLLOW-UP (OUTPATIENT)
Dept: MEDICAL GROUP | Facility: MEDICAL CENTER | Age: 60
End: 2025-05-08

## 2025-05-08 ENCOUNTER — HOSPITAL ENCOUNTER (OUTPATIENT)
Dept: LAB | Facility: MEDICAL CENTER | Age: 60
End: 2025-05-08
Payer: MEDICAID

## 2025-05-08 DIAGNOSIS — Z13.21 SCREENING FOR ENDOCRINE, NUTRITIONAL, METABOLIC AND IMMUNITY DISORDER: ICD-10-CM

## 2025-05-08 DIAGNOSIS — Z13.0 SCREENING FOR ENDOCRINE, NUTRITIONAL, METABOLIC AND IMMUNITY DISORDER: ICD-10-CM

## 2025-05-08 DIAGNOSIS — Z13.228 SCREENING FOR ENDOCRINE, NUTRITIONAL, METABOLIC AND IMMUNITY DISORDER: ICD-10-CM

## 2025-05-08 DIAGNOSIS — Z13.29 SCREENING FOR ENDOCRINE, NUTRITIONAL, METABOLIC AND IMMUNITY DISORDER: ICD-10-CM

## 2025-05-08 LAB
ALBUMIN SERPL BCP-MCNC: 3.7 G/DL (ref 3.2–4.9)
ALBUMIN/GLOB SERPL: 1.4 G/DL
ALP SERPL-CCNC: 90 U/L (ref 30–99)
ALT SERPL-CCNC: 32 U/L (ref 2–50)
ANION GAP SERPL CALC-SCNC: 7 MMOL/L (ref 7–16)
AST SERPL-CCNC: 39 U/L (ref 12–45)
BILIRUB SERPL-MCNC: 0.5 MG/DL (ref 0.1–1.5)
BUN SERPL-MCNC: 19 MG/DL (ref 8–22)
CALCIUM ALBUM COR SERPL-MCNC: 8.9 MG/DL (ref 8.5–10.5)
CALCIUM SERPL-MCNC: 8.7 MG/DL (ref 8.5–10.5)
CHLORIDE SERPL-SCNC: 107 MMOL/L (ref 96–112)
CHOLEST SERPL-MCNC: 165 MG/DL (ref 100–199)
CO2 SERPL-SCNC: 25 MMOL/L (ref 20–33)
CREAT SERPL-MCNC: 1 MG/DL (ref 0.5–1.4)
EST. AVERAGE GLUCOSE BLD GHB EST-MCNC: 126 MG/DL
GFR SERPLBLD CREATININE-BSD FMLA CKD-EPI: 85 ML/MIN/1.73 M 2
GLOBULIN SER CALC-MCNC: 2.6 G/DL (ref 1.9–3.5)
GLUCOSE SERPL-MCNC: 97 MG/DL (ref 65–99)
HBA1C MFR BLD: 6 % (ref 4–5.6)
HCV AB SER QL: NORMAL
HDLC SERPL-MCNC: 54 MG/DL
HIV 1+2 AB+HIV1 P24 AG SERPL QL IA: NORMAL
LDLC SERPL CALC-MCNC: 97 MG/DL
POTASSIUM SERPL-SCNC: 4.2 MMOL/L (ref 3.6–5.5)
PROT SERPL-MCNC: 6.3 G/DL (ref 6–8.2)
PSA SERPL DL<=0.01 NG/ML-MCNC: 0.29 NG/ML (ref 0–4)
SODIUM SERPL-SCNC: 139 MMOL/L (ref 135–145)
TRIGL SERPL-MCNC: 68 MG/DL (ref 0–149)

## 2025-05-08 PROCEDURE — 36415 COLL VENOUS BLD VENIPUNCTURE: CPT

## 2025-05-08 PROCEDURE — 84153 ASSAY OF PSA TOTAL: CPT

## 2025-05-08 PROCEDURE — 83036 HEMOGLOBIN GLYCOSYLATED A1C: CPT

## 2025-05-08 PROCEDURE — 87389 HIV-1 AG W/HIV-1&-2 AB AG IA: CPT

## 2025-05-08 PROCEDURE — 80053 COMPREHEN METABOLIC PANEL: CPT

## 2025-05-08 PROCEDURE — 86803 HEPATITIS C AB TEST: CPT

## 2025-05-08 PROCEDURE — 80061 LIPID PANEL: CPT

## 2025-05-11 ENCOUNTER — HOSPITAL ENCOUNTER (EMERGENCY)
Facility: MEDICAL CENTER | Age: 60
End: 2025-05-12
Attending: STUDENT IN AN ORGANIZED HEALTH CARE EDUCATION/TRAINING PROGRAM
Payer: MEDICAID

## 2025-05-11 ENCOUNTER — APPOINTMENT (OUTPATIENT)
Dept: RADIOLOGY | Facility: MEDICAL CENTER | Age: 60
End: 2025-05-11
Attending: STUDENT IN AN ORGANIZED HEALTH CARE EDUCATION/TRAINING PROGRAM
Payer: MEDICAID

## 2025-05-11 DIAGNOSIS — K20.90 ESOPHAGITIS: ICD-10-CM

## 2025-05-11 DIAGNOSIS — R10.84 GENERALIZED ABDOMINAL PAIN: ICD-10-CM

## 2025-05-11 DIAGNOSIS — R10.13 EPIGASTRIC PAIN: ICD-10-CM

## 2025-05-11 LAB
ALBUMIN SERPL BCP-MCNC: 3.8 G/DL (ref 3.2–4.9)
ALBUMIN/GLOB SERPL: 1.4 G/DL
ALP SERPL-CCNC: 107 U/L (ref 30–99)
ALT SERPL-CCNC: 30 U/L (ref 2–50)
ANION GAP SERPL CALC-SCNC: 12 MMOL/L (ref 7–16)
AST SERPL-CCNC: 45 U/L (ref 12–45)
BASOPHILS # BLD AUTO: 1.2 % (ref 0–1.8)
BASOPHILS # BLD: 0.1 K/UL (ref 0–0.12)
BILIRUB SERPL-MCNC: 0.2 MG/DL (ref 0.1–1.5)
BUN SERPL-MCNC: 22 MG/DL (ref 8–22)
CALCIUM ALBUM COR SERPL-MCNC: 9.1 MG/DL (ref 8.5–10.5)
CALCIUM SERPL-MCNC: 8.9 MG/DL (ref 8.5–10.5)
CHLORIDE SERPL-SCNC: 108 MMOL/L (ref 96–112)
CO2 SERPL-SCNC: 24 MMOL/L (ref 20–33)
CREAT SERPL-MCNC: 1.28 MG/DL (ref 0.5–1.4)
EKG IMPRESSION: NORMAL
EOSINOPHIL # BLD AUTO: 0.54 K/UL (ref 0–0.51)
EOSINOPHIL NFR BLD: 6.6 % (ref 0–6.9)
ERYTHROCYTE [DISTWIDTH] IN BLOOD BY AUTOMATED COUNT: 40.8 FL (ref 35.9–50)
GFR SERPLBLD CREATININE-BSD FMLA CKD-EPI: 63 ML/MIN/1.73 M 2
GLOBULIN SER CALC-MCNC: 2.7 G/DL (ref 1.9–3.5)
GLUCOSE SERPL-MCNC: 78 MG/DL (ref 65–99)
HCT VFR BLD AUTO: 41.4 % (ref 42–52)
HGB BLD-MCNC: 13.8 G/DL (ref 14–18)
IMM GRANULOCYTES # BLD AUTO: 0.05 K/UL (ref 0–0.11)
IMM GRANULOCYTES NFR BLD AUTO: 0.6 % (ref 0–0.9)
LIPASE SERPL-CCNC: 23 U/L (ref 11–82)
LYMPHOCYTES # BLD AUTO: 2.51 K/UL (ref 1–4.8)
LYMPHOCYTES NFR BLD: 30.5 % (ref 22–41)
MCH RBC QN AUTO: 28.1 PG (ref 27–33)
MCHC RBC AUTO-ENTMCNC: 33.3 G/DL (ref 32.3–36.5)
MCV RBC AUTO: 84.3 FL (ref 81.4–97.8)
MONOCYTES # BLD AUTO: 0.81 K/UL (ref 0–0.85)
MONOCYTES NFR BLD AUTO: 9.9 % (ref 0–13.4)
NEUTROPHILS # BLD AUTO: 4.21 K/UL (ref 1.82–7.42)
NEUTROPHILS NFR BLD: 51.2 % (ref 44–72)
NRBC # BLD AUTO: 0 K/UL
NRBC BLD-RTO: 0 /100 WBC (ref 0–0.2)
PLATELET # BLD AUTO: 282 K/UL (ref 164–446)
PMV BLD AUTO: 8.1 FL (ref 9–12.9)
POTASSIUM SERPL-SCNC: 4.1 MMOL/L (ref 3.6–5.5)
PROT SERPL-MCNC: 6.5 G/DL (ref 6–8.2)
RBC # BLD AUTO: 4.91 M/UL (ref 4.7–6.1)
SODIUM SERPL-SCNC: 144 MMOL/L (ref 135–145)
TROPONIN T SERPL-MCNC: 20 NG/L (ref 6–19)
WBC # BLD AUTO: 8.2 K/UL (ref 4.8–10.8)

## 2025-05-11 PROCEDURE — 85025 COMPLETE CBC W/AUTO DIFF WBC: CPT

## 2025-05-11 PROCEDURE — 700105 HCHG RX REV CODE 258: Mod: UD | Performed by: STUDENT IN AN ORGANIZED HEALTH CARE EDUCATION/TRAINING PROGRAM

## 2025-05-11 PROCEDURE — 84484 ASSAY OF TROPONIN QUANT: CPT

## 2025-05-11 PROCEDURE — 83690 ASSAY OF LIPASE: CPT

## 2025-05-11 PROCEDURE — 71045 X-RAY EXAM CHEST 1 VIEW: CPT

## 2025-05-11 PROCEDURE — 700111 HCHG RX REV CODE 636 W/ 250 OVERRIDE (IP): Mod: UD | Performed by: STUDENT IN AN ORGANIZED HEALTH CARE EDUCATION/TRAINING PROGRAM

## 2025-05-11 PROCEDURE — 96374 THER/PROPH/DIAG INJ IV PUSH: CPT

## 2025-05-11 PROCEDURE — 93005 ELECTROCARDIOGRAM TRACING: CPT | Mod: TC | Performed by: STUDENT IN AN ORGANIZED HEALTH CARE EDUCATION/TRAINING PROGRAM

## 2025-05-11 PROCEDURE — 99285 EMERGENCY DEPT VISIT HI MDM: CPT

## 2025-05-11 PROCEDURE — 93005 ELECTROCARDIOGRAM TRACING: CPT | Mod: TC

## 2025-05-11 PROCEDURE — 96375 TX/PRO/DX INJ NEW DRUG ADDON: CPT

## 2025-05-11 PROCEDURE — 80053 COMPREHEN METABOLIC PANEL: CPT

## 2025-05-11 PROCEDURE — 36415 COLL VENOUS BLD VENIPUNCTURE: CPT

## 2025-05-11 RX ORDER — MORPHINE SULFATE 4 MG/ML
4 INJECTION INTRAVENOUS ONCE
Status: COMPLETED | OUTPATIENT
Start: 2025-05-11 | End: 2025-05-11

## 2025-05-11 RX ORDER — SODIUM CHLORIDE 9 MG/ML
1000 INJECTION, SOLUTION INTRAVENOUS ONCE
Status: COMPLETED | OUTPATIENT
Start: 2025-05-11 | End: 2025-05-12

## 2025-05-11 RX ADMIN — SODIUM CHLORIDE 1000 ML: 9 INJECTION, SOLUTION INTRAVENOUS at 23:28

## 2025-05-11 RX ADMIN — MORPHINE SULFATE 4 MG: 4 INJECTION INTRAVENOUS at 23:25

## 2025-05-11 ASSESSMENT — PAIN DESCRIPTION - PAIN TYPE: TYPE: ACUTE PAIN

## 2025-05-11 ASSESSMENT — FIBROSIS 4 INDEX: FIB4 SCORE: 1.34

## 2025-05-12 ENCOUNTER — PHARMACY VISIT (OUTPATIENT)
Dept: PHARMACY | Facility: MEDICAL CENTER | Age: 60
End: 2025-05-12
Payer: COMMERCIAL

## 2025-05-12 VITALS
TEMPERATURE: 98 F | BODY MASS INDEX: 26.8 KG/M2 | OXYGEN SATURATION: 90 % | WEIGHT: 176.81 LBS | RESPIRATION RATE: 18 BRPM | DIASTOLIC BLOOD PRESSURE: 77 MMHG | HEART RATE: 71 BPM | SYSTOLIC BLOOD PRESSURE: 142 MMHG | HEIGHT: 68 IN

## 2025-05-12 PROCEDURE — 74177 CT ABD & PELVIS W/CONTRAST: CPT

## 2025-05-12 PROCEDURE — 700111 HCHG RX REV CODE 636 W/ 250 OVERRIDE (IP): Mod: UD | Performed by: STUDENT IN AN ORGANIZED HEALTH CARE EDUCATION/TRAINING PROGRAM

## 2025-05-12 PROCEDURE — 700102 HCHG RX REV CODE 250 W/ 637 OVERRIDE(OP): Performed by: STUDENT IN AN ORGANIZED HEALTH CARE EDUCATION/TRAINING PROGRAM

## 2025-05-12 PROCEDURE — RXMED WILLOW AMBULATORY MEDICATION CHARGE: Performed by: STUDENT IN AN ORGANIZED HEALTH CARE EDUCATION/TRAINING PROGRAM

## 2025-05-12 PROCEDURE — 700117 HCHG RX CONTRAST REV CODE 255: Mod: UD | Performed by: STUDENT IN AN ORGANIZED HEALTH CARE EDUCATION/TRAINING PROGRAM

## 2025-05-12 PROCEDURE — A9270 NON-COVERED ITEM OR SERVICE: HCPCS | Performed by: STUDENT IN AN ORGANIZED HEALTH CARE EDUCATION/TRAINING PROGRAM

## 2025-05-12 RX ORDER — PANTOPRAZOLE SODIUM 40 MG/10ML
40 INJECTION, POWDER, LYOPHILIZED, FOR SOLUTION INTRAVENOUS ONCE
Status: COMPLETED | OUTPATIENT
Start: 2025-05-12 | End: 2025-05-12

## 2025-05-12 RX ORDER — OMEPRAZOLE 20 MG/1
40 CAPSULE, DELAYED RELEASE ORAL DAILY
Qty: 60 CAPSULE | Refills: 0 | Status: SHIPPED | OUTPATIENT
Start: 2025-05-12

## 2025-05-12 RX ORDER — SUCRALFATE 1 G/1
1 TABLET ORAL
Qty: 120 TABLET | Refills: 3 | Status: SHIPPED | OUTPATIENT
Start: 2025-05-12

## 2025-05-12 RX ADMIN — LIDOCAINE HYDROCHLORIDE 30 ML: 20 SOLUTION ORAL; TOPICAL at 00:53

## 2025-05-12 RX ADMIN — PANTOPRAZOLE SODIUM 40 MG: 40 INJECTION, POWDER, FOR SOLUTION INTRAVENOUS at 01:18

## 2025-05-12 RX ADMIN — IOHEXOL 100 ML: 350 INJECTION, SOLUTION INTRAVENOUS at 00:30

## 2025-05-12 NOTE — DISCHARGE INSTRUCTIONS
Continue taking the antacids as prescribed.  In addition, you can take the sucralfate.  Please follow-up with GI as soon as possible.  Reduce your alcohol intake as this will help reduce inflammation.  Do not take any ibuprofen, Aleve, Motrin, as these will worsen your pain.

## 2025-05-12 NOTE — ED PROVIDER NOTES
"  ER Provider Note    Scribed for Wiliam Sanders M.D. by Fahad Chacko. 5/11/2025   10:59 PM    Primary Care Provider: BRYANT Staples    CHIEF COMPLAINT  Chief Complaint   Patient presents with    Abdominal Pain     Lower mid and epigastric pain for 6 months, seen here before for same      EXTERNAL RECORDS REVIEWED  Patient was seen by his PCP on the 7th of May, he was on antacids and stool softeners, known to have a history of EtOH use.    HPI/ROS  LIMITATION TO HISTORY   None noted   OUTSIDE HISTORIAN(S):  None noted     Iron Chaudhari is a 59 y.o. male who presents to the ED for evaluation of abdominal pain. Patient has a history of intermittent lower left abdominal pain which radiates to his back for the past 1 year. Patient arrives to the ED today at the direction of his PCP due to worsening abdominal pain. Patient says he has been struggling to receive an H pylori breath test as ordered by his PCP. Patient notes dry heaves beginning 2 nights ago. Patient had a bowel movement this morning, which was soft stool, he denies any bleeding or black or bloody stool . Patient has Hisotry of melena 1 year ago requiring admission. Patient is adherent with his prescribed medications. patient treats his pain with motrin and tylenol at home. He says he does not take \"a lot\" of ibuprofen. Patient denies history of diverticulitis. Patient says he drinks alcohol 3 times a week, \"when I drink I drink a lot.\"    PAST MEDICAL HISTORY  History reviewed. No pertinent past medical history.    SURGICAL HISTORY  Past Surgical History:   Procedure Laterality Date    SD UPPER GI ENDOSCOPY,DIAGNOSIS N/A 4/3/2024    Procedure: GASTROSCOPY;  Surgeon: Joanne Garcia M.D.;  Location: SURGERY SAME DAY [facility];  Service: Gastroenterology    SD UPPER GI ENDOSCOPY,BIOPSY N/A 4/3/2024    Procedure: GASTROSCOPY, WITH BIOPSY;  Surgeon: Joanne Garcia M.D.;  Location: SURGERY SAME DAY [facility];  Service: Gastroenterology " "   GASTROSCOPY WITH ENDOSTAT N/A 4/3/2024    Procedure: EGD, WITH CAUTERIZATION;  Surgeon: Joanne Garcia M.D.;  Location: SURGERY SAME DAY [facility];  Service: Gastroenterology    RESTORATE HEMOSTAS N/A 4/3/2024    Procedure: CONTROL OF HEMORRHAGE;  Surgeon: Joanne Garcia M.D.;  Location: SURGERY SAME DAY [facility];  Service: Gastroenterology    EAR MIDDLE EXPLORATION         FAMILY HISTORY  History reviewed. No pertinent family history.    SOCIAL HISTORY   reports that he has been smoking cigarettes. He has a 2.5 pack-year smoking history. His smokeless tobacco use includes chew. He reports that he does not currently use alcohol. He reports that he does not use drugs.    CURRENT MEDICATIONS  Previous Medications    DICYCLOMINE (BENTYL) 10 MG CAP    Take 1 Capsule by mouth 4 Times a Day,Before Meals and at Bedtime.    POLYETHYLENE GLYCOL 3350 (MIRALAX) 17 GM/SCOOP POWDER    Take 17 g by mouth every day.       ALLERGIES  No Known Allergies     PHYSICAL EXAM  /78   Pulse 77   Temp 36.2 °C (97.1 °F) (Temporal)   Resp 18   Ht 1.727 m (5' 8\")   Wt 80.2 kg (176 lb 12.9 oz)   SpO2 95%   BMI 26.88 kg/m²    General: Alert and oriented male sitting in reclined position in bed, flushed skin, uncomfortable appearing.   Head: Normocephalic atraumatic  Eyes: Extraocular motion intact  Neck: Supple, no rigidity  Cardiovascular: Regular rate and rhythm no murmurs rubs or gallops  Respiratory: Clear to auscultation bilaterally, equal chest rise and fall, no increased work of breathing  Abdomen: Soft,+ left lower quadrant and epigastric tenderness, no significant right upper quadrant tenderness. No guarding  Musculoskeletal: Warm and well perfused, no peripheral edema  Neuro: Alert,  no tongue fasciculations, no peripheral tremors not obviously responding to internal stimuli.  Integumentary: No wounds or rashes     DIAGNOSTIC STUDIES    Labs:   Labs Reviewed   CBC WITH DIFFERENTIAL - Abnormal; Notable for the " following components:       Result Value    Hemoglobin 13.8 (*)     Hematocrit 41.4 (*)     MPV 8.1 (*)     Eos (Absolute) 0.54 (*)     All other components within normal limits   COMP METABOLIC PANEL - Abnormal; Notable for the following components:    Alkaline Phosphatase 107 (*)     All other components within normal limits   TROPONIN - Abnormal; Notable for the following components:    Troponin T 20 (*)     All other components within normal limits   LIPASE   ESTIMATED GFR   URINALYSIS     Patient's troponin is at his baseline.  He does have a absolute eosinophilia, without anemia or elevation of BUN.  Lipase is not elevated, transaminases not elevated.      Results for orders placed or performed during the hospital encounter of 25   EKG   Result Value Ref Range    Report       [facility] Emergency Dept[facility]    Test Date:  2025  Pt Name:    TAHIRA FLORIAN              Department: ER  MRN:        [id number]                      Room:  Gender:     Male                         Technician: [id number]  :        1965                   Requested By:ER TRIAGE PROTOCOL  Order #:    [id number]                    Reading MD: Derick Sanders    Measurements  Intervals                                Axis  Rate:       77                           P:          0  NC:         164                          QRS:        44  QRSD:       91                           T:          62  QT:         388  QTc:        440    Interpretive Statements  Sinus rhythm, rate of 77, normal axis, normal intervals, Q waves in lead V2,  V3, not significantly changed compared to prior 24  Electronically Signed On 2025 22:54:30 PDT by Derick Sanders     EKG:   I have independently interpreted this EKG as detailed above.       Radiology:   This attending emergency physician has independently interpreted the diagnostic imaging associated with this visit and is awaiting the final reading from the radiologist.    Preliminary interpretation is a follows: No free air or free fluid    Radiologist interpretation:   CT-ABDOMEN-PELVIS WITH   Final Result      1.  Wall thickening of the distal esophagus and GE junction with mild adjacent stranding. Several small paraesophageal lymph nodes. This could relate to esophagitis but a mucosal mass cannot be excluded. Correlate with EGD.   2.  Colonic diverticulosis.      DX-CHEST-PORTABLE (1 VIEW)   Final Result         1. No acute cardiopulmonary abnormalities are identified.           INITIAL ASSESSMENT COURSE AND PLAN  Care Narrative     10:59 PM - Patient was evaluated at bedside. They present for abdominal pain. Pertinent PE findings include: Uncomfortable appearing, flushed skin, left lower quadrant and epigastric tenderness, no significant right upper quadrant tenderness. Differential diagnoses include but not limited to: Gastritis, hepatitis, pancreatitis, cholecystitis, diverticulitis.     Labs discussed above, overall notable for eosinophilia, troponin at baseline.  EKG obtained in triage is reassuring.  CT shows significant changes consistent with likely esophagitis, however patient is aware he needs a endoscopy to confirm diagnosis and rule out additional diagnoses.  We discussed return precautions for severe worsening pain, symptoms of melena, vomiting, and need for PPI/sucralfate in the meantime.  Patient feels better he is sleeping on reevaluation.      DISPOSITION AND DISCUSSIONS      Escalation of care considered, and ultimately not performed: acute inpatient care management, however at this time, the patient is most appropriate for outpatient management.    FINAL DIAGNOSIS  1. Esophagitis    2. Epigastric pain    3. Generalized abdominal pain         Fahad FAITH), am scribing for, and in the presence of, Derick Sanders M.D..    Electronically signed by: Fahad Eugene), 5/11/2025    Derick FAITH M.D. personally performed the services  described in this documentation, as scribed by Fahad Chacko in my presence, and it is both accurate and complete.      The note accurately reflects work and decisions made by me.  Derick Sanders M.D.  5/12/2025  1:56 AM

## 2025-05-12 NOTE — ED NOTES
Follow up with the medication from the pharmacy and said the meds was sent to red [facility]    Checked in red pod no medication in the tube station- followed up again    Central pharmacy said they have the medication

## 2025-05-12 NOTE — ED NOTES
Handed medication from pharmacy    Discharged in stable condition, alert and oriented, ambulatory. Prescribed medication and follow up appointment instructed. Health education imparted. Instructed to come back once symptoms worsened. Pt verbalized understanding of the information given. Removed IV line and applied pressure.

## 2025-05-12 NOTE — ED TRIAGE NOTES
Chief Complaint   Patient presents with    Abdominal Pain     Lower mid and epigastric pain for 6 months, seen here before for same      Vitals:    05/11/25 2151   BP: 130/78   Pulse: 77   Resp: 18   Temp: 36.2 °C (97.1 °F)   SpO2: 95%     Patient ambulatory to triage for above complaint. Patient A&Ox4, GCS 15, patient speaking in full sentences. Equal and unlabored respirations. Patient educated on triage process and encouraged to notify staff if condition worsens. Appropriate protocols ordered. Patient returned to the lobby in stable condition.

## 2025-05-14 ENCOUNTER — PHARMACY VISIT (OUTPATIENT)
Dept: PHARMACY | Facility: MEDICAL CENTER | Age: 60
End: 2025-05-14
Payer: COMMERCIAL

## 2025-05-14 ENCOUNTER — OFFICE VISIT (OUTPATIENT)
Dept: MEDICAL GROUP | Facility: MEDICAL CENTER | Age: 60
End: 2025-05-14
Payer: MEDICAID

## 2025-05-14 VITALS
HEART RATE: 66 BPM | TEMPERATURE: 98 F | SYSTOLIC BLOOD PRESSURE: 124 MMHG | DIASTOLIC BLOOD PRESSURE: 88 MMHG | OXYGEN SATURATION: 100 % | BODY MASS INDEX: 26.37 KG/M2 | WEIGHT: 173.4 LBS

## 2025-05-14 DIAGNOSIS — M43.16 ANTEROLISTHESIS OF LUMBAR SPINE: Primary | ICD-10-CM

## 2025-05-14 DIAGNOSIS — K20.90 ESOPHAGITIS: ICD-10-CM

## 2025-05-14 DIAGNOSIS — M54.50 LUMBAR BACK PAIN: ICD-10-CM

## 2025-05-14 PROCEDURE — 3074F SYST BP LT 130 MM HG: CPT

## 2025-05-14 PROCEDURE — 96372 THER/PROPH/DIAG INJ SC/IM: CPT

## 2025-05-14 PROCEDURE — 99214 OFFICE O/P EST MOD 30 MIN: CPT

## 2025-05-14 PROCEDURE — 3079F DIAST BP 80-89 MM HG: CPT

## 2025-05-14 PROCEDURE — RXMED WILLOW AMBULATORY MEDICATION CHARGE

## 2025-05-14 PROCEDURE — 99213 OFFICE O/P EST LOW 20 MIN: CPT | Mod: 25

## 2025-05-14 PROCEDURE — 700111 HCHG RX REV CODE 636 W/ 250 OVERRIDE (IP): Mod: JZ,UD

## 2025-05-14 RX ORDER — CYCLOBENZAPRINE HCL 5 MG
5-10 TABLET ORAL 3 TIMES DAILY PRN
Qty: 90 TABLET | Refills: 2 | Status: SHIPPED | OUTPATIENT
Start: 2025-05-14

## 2025-05-14 RX ORDER — KETOROLAC TROMETHAMINE 15 MG/ML
15 INJECTION, SOLUTION INTRAMUSCULAR; INTRAVENOUS ONCE
Status: COMPLETED | OUTPATIENT
Start: 2025-05-14 | End: 2025-05-14

## 2025-05-14 RX ADMIN — KETOROLAC TROMETHAMINE 15 MG: 15 INJECTION, SOLUTION INTRAMUSCULAR; INTRAVENOUS at 08:58

## 2025-05-14 RX ADMIN — KETOROLAC TROMETHAMINE 15 MG: 15 INJECTION, SOLUTION INTRAMUSCULAR; INTRAVENOUS at 08:57

## 2025-05-14 ASSESSMENT — FIBROSIS 4 INDEX: FIB4 SCORE: 1.78

## 2025-05-14 NOTE — PROGRESS NOTES
Verbal consent was acquired by the patient to use [employer] ambient listening note generation during this visit     Subjective:     CC:  The primary encounter diagnosis was Anterolisthesis of lumbar spine. Diagnoses of Esophagitis and Lumbar back pain were also pertinent to this visit.    HISTORY OF THE PRESENT ILLNESS: Patient is a 59 y.o. male.     History of Present Illness  The patient presents for evaluation of esophagitis and back pain.    Esophagitis  - Recently sought emergency care due to severe esophageal swelling, as identified by the ER physician.  - A follow-up with a gastroenterologist was recommended, but he has not yet initiated contact.  - The ER physician suggested an H. pylori test, which would require a 2-week discontinuation of omeprazole.  - Expresses concern about managing without omeprazole, as it aids in his sleep and overall well-being.  - Was prescribed another medication during his ER visit.  - Advised against the use of ibuprofen.    Abdominal Pain  - Experiencing intermittent abdominal pain for an extended period.  - Pain occasionally intensifies to the point of incapacitation.    Back Pain  - Recently, back pain has progressively worsened, rendering him unable to stand or sit comfortably.  - Imaging revealed a slipped disk.  - Experiences radiating pain down his legs when standing for prolonged periods.  - Accompanied by numbness in his fingers.  - Symptom was particularly noticeable during a recent episode while washing dishes.    Supplemental information: None.    Health Maintenance: reviewed and completed per patient preference.     ROS:   PER HPI.           Social History     Socioeconomic History    Marital status: Single     Spouse name: Not on file    Number of children: Not on file    Years of education: Not on file    Highest education level: 12th grade   Occupational History    Not on file   Tobacco Use    Smoking status: Some Days     Current packs/day: 0.25     Average  packs/day: 0.3 packs/day for 10.0 years (2.5 ttl pk-yrs)     Types: Cigarettes    Smokeless tobacco: Current     Types: Chew    Tobacco comments:     Chews 3 days/week   Vaping Use    Vaping status: Never Used   Substance and Sexual Activity    Alcohol use: Not Currently    Drug use: No    Sexual activity: Not on file   Other Topics Concern    Not on file   Social History Narrative    Not on file     Social Drivers of Health     Financial Resource Strain: Low Risk  (5/1/2024)    Overall Financial Resource Strain (CARDIA)     Difficulty of Paying Living Expenses: Not very hard   Food Insecurity: Food Insecurity Present (5/1/2024)    Hunger Vital Sign     Worried About Running Out of Food in the Last Year: Sometimes true     Ran Out of Food in the Last Year: Patient declined   Transportation Needs: No Transportation Needs (5/1/2024)    PRAPARE - Transportation     Lack of Transportation (Medical): No     Lack of Transportation (Non-Medical): No   Physical Activity: Inactive (5/1/2024)    Exercise Vital Sign     Days of Exercise per Week: 6 days     Minutes of Exercise per Session: 0 min   Stress: Not on file   Social Connections: Socially Isolated (5/1/2024)    Social Connection and Isolation Panel [NHANES]     Frequency of Communication with Friends and Family: Once a week     Frequency of Social Gatherings with Friends and Family: Once a week     Attends Spiritism Services: Never     Active Member of Clubs or Organizations: No     Attends Club or Organization Meetings: Never     Marital Status: Living with partner   Intimate Partner Violence: Not on file   Housing Stability: Low Risk  (5/1/2024)    Housing Stability Vital Sign     Unable to Pay for Housing in the Last Year: No     Number of Places Lived in the Last Year: 1     Unstable Housing in the Last Year: No      Allergies[1]         Current Outpatient Medications:     cyclobenzaprine, 5-10 mg, Oral, TID PRN, Taking As Needed    sucralfate, 1 g, Oral, 4X/DAY  ACHS    omeprazole, 40 mg, Oral, DAILY    polyethylene glycol 3350, 17 g, Oral, DAILY    dicyclomine, 10 mg, Oral, 4X/DAY ACHS   Objective:     Exam: /88 (BP Location: Right arm, Patient Position: Sitting, BP Cuff Size: Adult)   Pulse 66   Temp 36.7 °C (98 °F) (Temporal)   Wt 78.7 kg (173 lb 6.4 oz)   SpO2 100%  Body mass index is 26.37 kg/m².    Physical Exam:  Constitutional: Alert, no distress, well-groomed.  Skin: Warm, dry, good turgor, no rashes in visible areas.  Eye: Equal, round and reactive, conjunctiva clear, lids normal.  ENMT: Lips without lesions, good dentition, moist mucous membranes.  Neck: Trachea midline, no masses, no thyromegaly.  Respiratory: Unlabored respiratory effort, no cough.  Abd: soft, non tender, non distended, normal BS  MSK: Normal gait, moves all extremities.  Neuro: Grossly non-focal.   Psych: Alert and oriented x3, normal affect and mood.    Labs: Reviewed    Assessment & Plan:   59 y.o. male with the following -    Assessment & Plan  1. Esophagitis: Stable.  - Referral to [facility] has been initiated for further evaluation.  - Endoscopy with biopsy is recommended if unable to discontinue omeprazole for 2 weeks.  - Advised to avoid ibuprofen due to esophagitis and gastritis.    2. Back pain: Chronic.  - Imaging studies revealed a slipped disk.  - Referral to a neurosurgeon has been made for further evaluation and discussion of potential treatment options.  - Toradol injection will be administered today for immediate pain relief.  - Prescription for a muscle relaxer will be sent to the pharmacy attached to the building.  - Advised to strengthen core muscles to help relieve pain and pressure off the back.    Follow-up  - Call GI to schedule an appointment with [facility].      1. Esophagitis    2. Anterolisthesis of lumbar spine (Primary)  - Referral to Neurosurgery    3. Lumbar back pain  - ketorolac (Toradol) 15 MG/ML injection 15 mg  -  ketorolac (Toradol) 15 MG/ML injection 15 mg  - cyclobenzaprine (FLEXERIL) 5 MG tablet; Take 1-2 Tablets by mouth 3 times a day as needed for Moderate Pain, Muscle Spasms or Mild Pain.  Dispense: 90 Tablet; Refill: 2        Return if symptoms worsen or fail to improve.    Please note that this dictation was created using voice recognition software. I have made every reasonable attempt to correct obvious errors, but I expect that there are errors of grammar and possibly content that I did not discover before finalizing the note.             [1] No Known Allergies

## 2025-05-15 ENCOUNTER — APPOINTMENT (OUTPATIENT)
Dept: RADIOLOGY | Facility: MEDICAL CENTER | Age: 60
End: 2025-05-15
Payer: MEDICAID

## 2025-05-20 NOTE — Clinical Note
REFERRAL APPROVAL NOTICE         Sent on May 20, 2025                   Iron Chaudhari  [address] [facility] [address]                   Dear Mr. Chaudhari,    After a careful review of the medical information and benefit coverage, [facility] has processed your referral. See below for additional details.    If applicable, you must be actively enrolled with your insurance for coverage of the authorized service. If you have any questions regarding your coverage, please contact your insurance directly.    REFERRAL INFORMATION   Referral #:  [id number]  Referred-To Provider    Referred-By Provider:  Neurosurgery    BRYANT Staples   [facility]      [address]  [facility] [address]  [phone] [id number]  [address]  [facility] [address]  [phone]    Referral Start Date:  05/14/2025  Referral End Date:   05/14/2026             SCHEDULING  If you do not already have an appointment, please call [phone] to make an appointment.     MORE INFORMATION  If you do not already have a [email or web address] account, sign up at: [email or web address]  You can access your medical information, make appointments, see lab results, billing information, and more.  If you have questions regarding this referral, please contact  the [facility] Referrals department at:             [phone]. Monday - Friday 8:00AM - 5:00PM.     Sincerely,    [facility]

## (undated) DEVICE — BITE BLOCK, DISP.

## (undated) DEVICE — MASK PANORAMIC OXYGEN PRO2 (30EA/CA)

## (undated) DEVICE — SET LEADWIRE 5 LEAD BEDSIDE DISPOSABLE ECG (1SET OF 5/EA)

## (undated) DEVICE — SET EXTENSION WITH 2 PORTS (48EA/CA) ***PART #2C8610 IS A SUBSTITUTE*****

## (undated) DEVICE — WATER IRRIGATION STERILE 1000ML (12EA/CA)

## (undated) DEVICE — AGENT HEMOSTATIC ENDOCLOT PHS 3G (1EA)

## (undated) DEVICE — FORCEP RADIAL JAW 4 STANDARD CAPACITY W/NEEDLE 240CM (40EA/BX)

## (undated) DEVICE — GOLD PROBE 7FR (5/BX)

## (undated) DEVICE — BUTTON ENDOSCOPY DISPOSABLE

## (undated) DEVICE — TUBE CONNECTING SUCTION - CLEAR PLASTIC STERILE 72 IN (50EA/CA)

## (undated) DEVICE — PORT AUXILLARY WATER (50EA/BX)

## (undated) DEVICE — NEPTUNE 4 PORT MANIFOLD - (20/PK)

## (undated) DEVICE — KIT CUSTOM PROCEDURE SINGLE FOR ENDO (15/CA)

## (undated) DEVICE — SODIUM CHL IRRIGATION 0.9% 1000ML (12EA/CA)

## (undated) DEVICE — CONTAINER, SPECIMEN, STERILE

## (undated) DEVICE — FILM CASSETTE ENDO

## (undated) DEVICE — TUBING CLEARLINK DUO-VENT - C-FLO (48EA/CA)

## (undated) DEVICE — LACTATED RINGERS INJ 1000 ML - (14EA/CA 60CA/PF)

## (undated) DEVICE — CANISTER SUCTION RIGID RED 1500CC (40EA/CA)

## (undated) DEVICE — SENSOR OXIMETER ADULT SPO2 RD SET (20EA/BX)